# Patient Record
Sex: MALE | Race: WHITE | Employment: FULL TIME | URBAN - METROPOLITAN AREA
[De-identification: names, ages, dates, MRNs, and addresses within clinical notes are randomized per-mention and may not be internally consistent; named-entity substitution may affect disease eponyms.]

---

## 2018-02-20 PROBLEM — M54.30 SCIATIC LEG PAIN: Status: ACTIVE | Noted: 2018-02-20

## 2018-04-02 ENCOUNTER — TELEPHONE (OUTPATIENT)
Dept: FAMILY MEDICINE CLINIC | Age: 28
End: 2018-04-02

## 2018-04-11 ENCOUNTER — TELEPHONE (OUTPATIENT)
Dept: FAMILY MEDICINE CLINIC | Age: 28
End: 2018-04-11

## 2018-04-11 DIAGNOSIS — M50.30 DDD (DEGENERATIVE DISC DISEASE), CERVICAL: ICD-10-CM

## 2018-04-11 DIAGNOSIS — M54.30 SCIATIC LEG PAIN: ICD-10-CM

## 2018-04-11 DIAGNOSIS — M51.9 LUMBAR DISC DISEASE: Primary | ICD-10-CM

## 2018-06-28 ENCOUNTER — TELEPHONE (OUTPATIENT)
Dept: FAMILY MEDICINE CLINIC | Age: 28
End: 2018-06-28

## 2018-07-10 ENCOUNTER — OFFICE VISIT (OUTPATIENT)
Dept: FAMILY MEDICINE CLINIC | Age: 28
End: 2018-07-10
Payer: MEDICAID

## 2018-07-10 VITALS
WEIGHT: 264.4 LBS | SYSTOLIC BLOOD PRESSURE: 120 MMHG | DIASTOLIC BLOOD PRESSURE: 82 MMHG | RESPIRATION RATE: 18 BRPM | HEART RATE: 66 BPM | OXYGEN SATURATION: 98 % | HEIGHT: 74 IN | BODY MASS INDEX: 33.93 KG/M2

## 2018-07-10 DIAGNOSIS — M50.30 DDD (DEGENERATIVE DISC DISEASE), CERVICAL: ICD-10-CM

## 2018-07-10 DIAGNOSIS — M51.9 LUMBAR DISC DISEASE: Primary | ICD-10-CM

## 2018-07-10 DIAGNOSIS — M54.30 SCIATIC LEG PAIN: ICD-10-CM

## 2018-07-10 PROCEDURE — 4004F PT TOBACCO SCREEN RCVD TLK: CPT | Performed by: FAMILY MEDICINE

## 2018-07-10 PROCEDURE — 99213 OFFICE O/P EST LOW 20 MIN: CPT | Performed by: FAMILY MEDICINE

## 2018-07-10 PROCEDURE — G8417 CALC BMI ABV UP PARAM F/U: HCPCS | Performed by: FAMILY MEDICINE

## 2018-07-10 PROCEDURE — G8427 DOCREV CUR MEDS BY ELIG CLIN: HCPCS | Performed by: FAMILY MEDICINE

## 2018-07-10 ASSESSMENT — ENCOUNTER SYMPTOMS
PHOTOPHOBIA: 0
STRIDOR: 0
NAUSEA: 0
SPUTUM PRODUCTION: 0
CONSTIPATION: 0
ORTHOPNEA: 0
BOWEL INCONTINENCE: 0
HEARTBURN: 0
SORE THROAT: 0
SHORTNESS OF BREATH: 0
COUGH: 0
BLOOD IN STOOL: 0
EYE DISCHARGE: 0
BACK PAIN: 1
ABDOMINAL PAIN: 0
GASTROINTESTINAL NEGATIVE: 1
BLURRED VISION: 0
VOMITING: 0
SINUS PAIN: 0
EYES NEGATIVE: 1
DIARRHEA: 0
EYE REDNESS: 0
RESPIRATORY NEGATIVE: 1
DOUBLE VISION: 0
HEMOPTYSIS: 0
WHEEZING: 0
EYE PAIN: 0

## 2018-07-10 ASSESSMENT — PATIENT HEALTH QUESTIONNAIRE - PHQ9
2. FEELING DOWN, DEPRESSED OR HOPELESS: 0
SUM OF ALL RESPONSES TO PHQ9 QUESTIONS 1 & 2: 0
SUM OF ALL RESPONSES TO PHQ QUESTIONS 1-9: 0
1. LITTLE INTEREST OR PLEASURE IN DOING THINGS: 0

## 2018-07-10 NOTE — PATIENT INSTRUCTIONS
Patient Education        Back Pain: Care Instructions  Your Care Instructions    Back pain has many possible causes. It is often related to problems with muscles and ligaments of the back. It may also be related to problems with the nerves, discs, or bones of the back. Moving, lifting, standing, sitting, or sleeping in an awkward way can strain the back. Sometimes you don't notice the injury until later. Arthritis is another common cause of back pain. Although it may hurt a lot, back pain usually improves on its own within several weeks. Most people recover in 12 weeks or less. Using good home treatment and being careful not to stress your back can help you feel better sooner. Follow-up care is a key part of your treatment and safety. Be sure to make and go to all appointments, and call your doctor if you are having problems. It's also a good idea to know your test results and keep a list of the medicines you take. How can you care for yourself at home? · Sit or lie in positions that are most comfortable and reduce your pain. Try one of these positions when you lie down:  ¨ Lie on your back with your knees bent and supported by large pillows. ¨ Lie on the floor with your legs on the seat of a sofa or chair. [place] on your side with your knees and hips bent and a pillow between your legs. ¨ Lie on your stomach if it does not make pain worse. · Do not sit up in bed, and avoid soft couches and twisted positions. Bed rest can help relieve pain at first, but it delays healing. Avoid bed rest after the first day of back pain. · Change positions every 30 minutes. If you must sit for long periods of time, take breaks from sitting. Get up and walk around, or lie in a comfortable position. · Try using a heating pad on a low or medium setting for 15 to 20 minutes every 2 or 3 hours. Try a warm shower in place of one session with the heating pad.   · You can also try an ice pack for 10 to 15 minutes every 2 to 3 Exercises  Your Care Instructions  Here are some examples of typical rehabilitation exercises for your condition. Start each exercise slowly. Ease off the exercise if you start to have pain. Your doctor or physical therapist will tell you when you can start these exercises and which ones will work best for you. How to stay safe  These exercises can help you move easier and feel better. But when you first start doing them, you may have more pain in your back. This is normal. But it is important to pay close attention to your pain during and after each exercise. · Keep doing these exercises if your pain stays the same or moves from your leg and buttock more toward the middle of your spine. Pain moving out of your leg and buttock is a good sign. · Stop doing these exercises if your pain gets worse in your leg and buttock. Stop if you start to have pain in your leg and buttock that you didn't have before. Be sure to do these exercises in the order they appear. Note how your pain changes before you move to the next one. If your pain is much worse right after exercise and stays worse the next day, do not do any of these exercises. How to do the exercises  1. Rest on belly    1. Lie on your stomach, face down, with your head turned to the side. Place your arms beside your body. If this bothers your neck, place your hands, one on top of the other, underneath your forehead. This will help support your head and neck. 2. Try to relax your lower back muscles as much as you can. 3. Continue to lie on your stomach for 2 minutes. 4. If your pain spreads down your leg or increases down your leg, stop this exercise and do not do the next exercises. 2. Press-up    1. Lie on your stomach, face down. Keep your elbows tucked into your sides and under your shoulders. 2. Press your elbows down into the floor to raise your upper back. As you do this, relax your stomach muscles.  Allow your back to arch without using your back link.  Current as of: November 29, 2017  Content Version: 11.6  © 2096-3468 [employer]. Care instructions adapted under license by [facility] ([facility]). If you have questions about a medical condition or this instruction, always ask your healthcare professional. [address] any warranty or liability for your use of this information.

## 2018-07-10 NOTE — PROGRESS NOTES
joint pain, myalgias and neck pain. Skin: Negative. Negative for itching and rash. Neurological: Positive for tingling and sensory change. Negative for dizziness, tremors, speech change, focal weakness, seizures, loss of consciousness, weakness, numbness, headaches and paresthesias. Sciatic pain into his feet   Endo/Heme/Allergies: Negative. Negative for environmental allergies and polydipsia. Does not bruise/bleed easily. Psychiatric/Behavioral: Negative. Past Medical/Surgical Hx;  Reviewed with patient      Diagnosis Date    Asthma      Past Surgical History:   Procedure Laterality Date    KNEE ARTHROSCOPY         Past Family Hx:  Reviewed with patient  Family History   Problem Relation Age of Onset    Diabetes Father        Social Hx:  Reviewed with patient  Social History   Substance Use Topics    Smoking status: Never Smoker    Smokeless tobacco: Current User     Types: Chew    Alcohol use 0.0 oz/week      Comment: SOCIALLY       OBJECTIVE  /82   Pulse 66   Resp 18   Ht 6' 2\" (1.88 m)   Wt 264 lb 6.4 oz (119.9 kg)   SpO2 98%   BMI 33.95 kg/m²     Problem List:  Aviva Marie  does not have any pertinent problems on file. PHYS EX:  Physical Exam   Constitutional: He is oriented to person, place, and time. He appears well-developed and well-nourished. No distress. HENT:   Head: Normocephalic and atraumatic. Right Ear: External ear normal.   Left Ear: External ear normal.   Nose: Nose normal.   Mouth/Throat: Oropharynx is clear and moist. No oropharyngeal exudate. Eyes: Conjunctivae and EOM are normal. Pupils are equal, round, and reactive to light. Right eye exhibits no discharge. Left eye exhibits no discharge. No scleral icterus. Neck: Normal range of motion. Neck supple. No JVD present. No tracheal deviation present. No thyromegaly present. Cardiovascular: Normal rate, regular rhythm and normal heart sounds. Exam reveals no gallop and no friction rub.     No murmur

## 2018-07-30 ENCOUNTER — HOSPITAL ENCOUNTER (OUTPATIENT)
Dept: MRI IMAGING | Age: 28
Discharge: HOME | End: 2018-08-01

## 2018-07-30 DIAGNOSIS — M54.30 SCIATIC LEG PAIN: ICD-10-CM

## 2018-07-30 DIAGNOSIS — M51.9 LUMBAR DISC DISEASE: ICD-10-CM

## 2018-07-30 PROCEDURE — 72148 MRI LUMBAR SPINE W/O DYE: CPT

## 2018-08-03 ENCOUNTER — TELEPHONE (OUTPATIENT)
Dept: FAMILY MEDICINE CLINIC | Age: 28
End: 2018-08-03

## 2018-08-14 ENCOUNTER — TELEPHONE (OUTPATIENT)
Dept: FAMILY MEDICINE CLINIC | Age: 28
End: 2018-08-14

## 2018-08-14 DIAGNOSIS — M50.30 DDD (DEGENERATIVE DISC DISEASE), CERVICAL: Primary | ICD-10-CM

## 2018-08-14 DIAGNOSIS — M51.9 LUMBAR DISC DISEASE: ICD-10-CM

## 2018-08-14 NOTE — TELEPHONE ENCOUNTER
Order placed. Patient notified of appt 8/21/18 with Dr Matthew Baird in [place], advised patient to arrive 15 mins early and bring ID, INS and any medication he may be taking.

## 2018-08-14 NOTE — TELEPHONE ENCOUNTER
Patient's wife De Late contacted office stating patient is in pain, he had to call off work today, patient is becoming upset that he has not been referred to a specialist for back pain, patient did have MRI, would you order this for patient

## 2018-09-27 ENCOUNTER — TELEPHONE (OUTPATIENT)
Dept: FAMILY MEDICINE CLINIC | Age: 28
End: 2018-09-27

## 2018-09-27 DIAGNOSIS — M50.30 DDD (DEGENERATIVE DISC DISEASE), CERVICAL: ICD-10-CM

## 2018-09-27 DIAGNOSIS — M51.9 LUMBAR DISC DISEASE: Primary | ICD-10-CM

## 2018-09-27 DIAGNOSIS — M54.30 SCIATIC LEG PAIN: ICD-10-CM

## 2018-09-27 NOTE — TELEPHONE ENCOUNTER
Patient's wife contacted office requesting a referral to [facility] pain clinic for back pain.  Patient is scheduled at [facility] 10/19 to see a back surgeon, but patient's wife is requesting referral until he is seen at specialist

## 2019-09-03 ENCOUNTER — INITIAL CONSULT (OUTPATIENT)
Dept: NEUROSURGERY | Age: 29
End: 2019-09-03
Payer: COMMERCIAL

## 2019-09-03 VITALS
SYSTOLIC BLOOD PRESSURE: 114 MMHG | HEIGHT: 74 IN | WEIGHT: 266 LBS | HEART RATE: 61 BPM | DIASTOLIC BLOOD PRESSURE: 71 MMHG | BODY MASS INDEX: 34.14 KG/M2

## 2019-09-03 DIAGNOSIS — M51.26 LUMBAR DISC HERNIATION: Primary | ICD-10-CM

## 2019-09-03 PROCEDURE — 99203 OFFICE O/P NEW LOW 30 MIN: CPT | Performed by: PHYSICIAN ASSISTANT

## 2019-09-03 RX ORDER — LIDOCAINE 50 MG/G
1 PATCH TOPICAL DAILY
COMMUNITY

## 2019-09-03 RX ORDER — OXYCODONE AND ACETAMINOPHEN 7.5; 325 MG/1; MG/1
1 TABLET ORAL EVERY 4 HOURS PRN
COMMUNITY
End: 2021-04-27

## 2019-09-03 ASSESSMENT — ENCOUNTER SYMPTOMS
EYES NEGATIVE: 1
ALLERGIC/IMMUNOLOGIC NEGATIVE: 1
BACK PAIN: 1
GASTROINTESTINAL NEGATIVE: 1
RESPIRATORY NEGATIVE: 1

## 2020-11-05 ENCOUNTER — APPOINTMENT (OUTPATIENT)
Dept: GENERAL RADIOLOGY | Age: 30
End: 2020-11-05
Payer: COMMERCIAL

## 2020-11-05 ENCOUNTER — HOSPITAL ENCOUNTER (EMERGENCY)
Age: 30
Discharge: HOME | End: 2020-11-05
Attending: EMERGENCY MEDICINE
Payer: COMMERCIAL

## 2020-11-05 VITALS
OXYGEN SATURATION: 96 % | HEIGHT: 74 IN | HEART RATE: 64 BPM | SYSTOLIC BLOOD PRESSURE: 130 MMHG | DIASTOLIC BLOOD PRESSURE: 78 MMHG | RESPIRATION RATE: 14 BRPM | TEMPERATURE: 98.8 F | BODY MASS INDEX: 34.65 KG/M2 | WEIGHT: 270 LBS

## 2020-11-05 LAB
ANION GAP SERPL CALCULATED.3IONS-SCNC: 10 MMOL/L (ref 7–16)
BASOPHILS ABSOLUTE: 0.02 E9/L (ref 0–0.2)
BASOPHILS RELATIVE PERCENT: 0.5 % (ref 0–2)
BUN BLDV-MCNC: 8 MG/DL (ref 6–20)
CALCIUM SERPL-MCNC: 9.7 MG/DL (ref 8.6–10.2)
CHLORIDE BLD-SCNC: 99 MMOL/L (ref 98–107)
CO2: 27 MMOL/L (ref 22–29)
CREAT SERPL-MCNC: 0.8 MG/DL (ref 0.7–1.2)
EOSINOPHILS ABSOLUTE: 0.01 E9/L (ref 0.05–0.5)
EOSINOPHILS RELATIVE PERCENT: 0.3 % (ref 0–6)
GFR AFRICAN AMERICAN: >60
GFR NON-AFRICAN AMERICAN: >60 ML/MIN/1.73
GLUCOSE BLD-MCNC: 125 MG/DL (ref 74–99)
HCT VFR BLD CALC: 41.3 % (ref 37–54)
HEMOGLOBIN: 14.1 G/DL (ref 12.5–16.5)
IMMATURE GRANULOCYTES #: 0.03 E9/L
IMMATURE GRANULOCYTES %: 0.8 % (ref 0–5)
INFLUENZA A BY PCR: NOT DETECTED
INFLUENZA B BY PCR: NOT DETECTED
LACTIC ACID: 1.4 MMOL/L (ref 0.5–2.2)
LYMPHOCYTES ABSOLUTE: 0.3 E9/L (ref 1.5–4)
LYMPHOCYTES RELATIVE PERCENT: 7.9 % (ref 20–42)
MCH RBC QN AUTO: 30.1 PG (ref 26–35)
MCHC RBC AUTO-ENTMCNC: 34.1 % (ref 32–34.5)
MCV RBC AUTO: 88.1 FL (ref 80–99.9)
MONOCYTES ABSOLUTE: 0.58 E9/L (ref 0.1–0.95)
MONOCYTES RELATIVE PERCENT: 15.3 % (ref 2–12)
NEUTROPHILS ABSOLUTE: 2.86 E9/L (ref 1.8–7.3)
NEUTROPHILS RELATIVE PERCENT: 75.2 % (ref 43–80)
PDW BLD-RTO: 11.9 FL (ref 11.5–15)
PLATELET # BLD: 202 E9/L (ref 130–450)
PMV BLD AUTO: 8.8 FL (ref 7–12)
POTASSIUM SERPL-SCNC: 4.1 MMOL/L (ref 3.5–5)
RBC # BLD: 4.69 E12/L (ref 3.8–5.8)
SODIUM BLD-SCNC: 136 MMOL/L (ref 132–146)
WBC # BLD: 3.8 E9/L (ref 4.5–11.5)

## 2020-11-05 PROCEDURE — 83605 ASSAY OF LACTIC ACID: CPT

## 2020-11-05 PROCEDURE — 6360000002 HC RX W HCPCS: Performed by: EMERGENCY MEDICINE

## 2020-11-05 PROCEDURE — 96375 TX/PRO/DX INJ NEW DRUG ADDON: CPT

## 2020-11-05 PROCEDURE — 85025 COMPLETE CBC W/AUTO DIFF WBC: CPT

## 2020-11-05 PROCEDURE — 71046 X-RAY EXAM CHEST 2 VIEWS: CPT

## 2020-11-05 PROCEDURE — 36415 COLL VENOUS BLD VENIPUNCTURE: CPT

## 2020-11-05 PROCEDURE — 99284 EMERGENCY DEPT VISIT MOD MDM: CPT

## 2020-11-05 PROCEDURE — 2580000003 HC RX 258: Performed by: EMERGENCY MEDICINE

## 2020-11-05 PROCEDURE — U0003 INFECTIOUS AGENT DETECTION BY NUCLEIC ACID (DNA OR RNA); SEVERE ACUTE RESPIRATORY SYNDROME CORONAVIRUS 2 (SARS-COV-2) (CORONAVIRUS DISEASE [COVID-19]), AMPLIFIED PROBE TECHNIQUE, MAKING USE OF HIGH THROUGHPUT TECHNOLOGIES AS DESCRIBED BY CMS-2020-01-R: HCPCS

## 2020-11-05 PROCEDURE — 80048 BASIC METABOLIC PNL TOTAL CA: CPT

## 2020-11-05 PROCEDURE — 96374 THER/PROPH/DIAG INJ IV PUSH: CPT

## 2020-11-05 PROCEDURE — 87502 INFLUENZA DNA AMP PROBE: CPT

## 2020-11-05 PROCEDURE — 2500000003 HC RX 250 WO HCPCS: Performed by: EMERGENCY MEDICINE

## 2020-11-05 RX ORDER — KETOROLAC TROMETHAMINE 30 MG/ML
30 INJECTION, SOLUTION INTRAMUSCULAR; INTRAVENOUS ONCE
Status: COMPLETED | OUTPATIENT
Start: 2020-11-05 | End: 2020-11-05

## 2020-11-05 RX ORDER — 0.9 % SODIUM CHLORIDE 0.9 %
2000 INTRAVENOUS SOLUTION INTRAVENOUS ONCE
Status: COMPLETED | OUTPATIENT
Start: 2020-11-05 | End: 2020-11-05

## 2020-11-05 RX ADMIN — SODIUM CHLORIDE 2000 ML: 9 INJECTION, SOLUTION INTRAVENOUS at 06:01

## 2020-11-05 RX ADMIN — FAMOTIDINE 20 MG: 10 INJECTION INTRAVENOUS at 06:01

## 2020-11-05 RX ADMIN — KETOROLAC TROMETHAMINE 30 MG: 30 INJECTION, SOLUTION INTRAMUSCULAR; INTRAVENOUS at 06:01

## 2020-11-05 ASSESSMENT — PAIN SCALES - GENERAL
PAINLEVEL_OUTOF10: 7
PAINLEVEL_OUTOF10: 5

## 2020-11-05 ASSESSMENT — PAIN DESCRIPTION - LOCATION: LOCATION: HEAD

## 2020-11-05 ASSESSMENT — PAIN DESCRIPTION - DESCRIPTORS: DESCRIPTORS: HEADACHE

## 2020-11-05 ASSESSMENT — PAIN DESCRIPTION - PAIN TYPE: TYPE: ACUTE PAIN

## 2020-11-05 NOTE — ED PROVIDER NOTES
Department of Emergency Medicine   ED  Provider Note  Admit Date/RoomTime: 11/5/2020  5:04 AM  ED Room: 10/10   Chief Complaint:   Fever (since last night; 102.4 took ibuprofen and theraflu) and Fatigue    History of Present Illness   Source of history provided by:  patient. History/Exam Limitations: none. Mary Foster is a 26 y.o. old male who has a past medical Hx of:   Past Medical History:   Diagnosis Date    Asthma     presents to the emergency department by private vehicle and ambulatory, for fever, which began 1 day(s) prior to arrival.  The fever is described as: fevers up to 102.4 degrees and measured by forehead thermometer. Since onset the symptoms have been persistent. His symptoms are associated with fatigue, malaise and muscle aches. There has been NO history of abdominal pain or chest pain. No shortness of breath no nausea vomiting or diarrhea no loss of taste or smell denies any cough. He has been treated with OTC antipyretics prior to arrival.    .  ROS    Pertinent positives and negatives are stated within HPI, all other systems reviewed and are negative. Past Surgical History:   Procedure Laterality Date    KNEE ARTHROSCOPY     Social History:  reports that he has never smoked. He has quit using smokeless tobacco.  His smokeless tobacco use included chew. He reports current alcohol use. He reports that he does not use drugs. Family History: family history includes Diabetes in his father. Allergies: Patient has no known allergies. Physical Exam           ED Triage Vitals   BP Temp Temp Source Pulse Resp SpO2 Height Weight   11/05/20 0510 11/05/20 0510 11/05/20 0510 11/05/20 0510 11/05/20 0510 11/05/20 0510 11/05/20 0508 11/05/20 0508   130/78 100.3 °F (37.9 °C) Temporal 82 16 95 % 6' 2\" (1.88 m) 270 lb (122.5 kg)     Oxygen Saturation Interpretation: Normal.    Constitutional:  Alert, development consistent with age. HEENT:  NC/NT. Airway patent. Neck:  Normal ROM. Supple. Respiratory:  Clear to auscultation and breath sounds equal.  CV:  Regular rate and rhythm, normal heart sounds, without pathological murmurs, ectopy, gallops, or rubs. GI: Abdomen Soft, nontender, good bowel sounds. No firm or pulsatile mass. Back:  No costovertebral tenderness. Integument:  Normal turgor. Warm, dry, without visible rash, unless noted elsewhere. Lymphatic: no lymphadenopathy noted  Neurological:  Oriented. Motor functions intact. Lab / Imaging Results   (All laboratory and radiology results have been personally reviewed by myself)  Labs:  Results for orders placed or performed during the hospital encounter of 11/05/20   Rapid influenza A/B antigens    Specimen: Nares   Result Value Ref Range    Influenza A by PCR Not Detected Not Detected    Influenza B by PCR Not Detected Not Detected   CBC Auto Differential   Result Value Ref Range    WBC 3.8 (L) 4.5 - 11.5 E9/L    RBC 4.69 3.80 - 5.80 E12/L    Hemoglobin 14.1 12.5 - 16.5 g/dL    Hematocrit 41.3 37.0 - 54.0 %    MCV 88.1 80.0 - 99.9 fL    MCH 30.1 26.0 - 35.0 pg    MCHC 34.1 32.0 - 34.5 %    RDW 11.9 11.5 - 15.0 fL    Platelets 358 938 - 797 E9/L    MPV 8.8 7.0 - 12.0 fL    Neutrophils % 75.2 43.0 - 80.0 %    Immature Granulocytes % 0.8 0.0 - 5.0 %    Lymphocytes % 7.9 (L) 20.0 - 42.0 %    Monocytes % 15.3 (H) 2.0 - 12.0 %    Eosinophils % 0.3 0.0 - 6.0 %    Basophils % 0.5 0.0 - 2.0 %    Neutrophils Absolute 2.86 1.80 - 7.30 E9/L    Immature Granulocytes # 0.03 E9/L    Lymphocytes Absolute 0.30 (L) 1.50 - 4.00 E9/L    Monocytes Absolute 0.58 0.10 - 0.95 E9/L    Eosinophils Absolute 0.01 (L) 0.05 - 0.50 E9/L    Basophils Absolute 0.02 0.00 - 0.20 E9/L   Lactic Acid, Plasma   Result Value Ref Range    Lactic Acid 1.4 0.5 - 2.2 mmol/L     Imaging: All Radiology results interpreted by Radiologist unless otherwise noted. XR CHEST (2 VW)   Final Result      There is no acute abnormality seen.            ED Course / Medical Decision

## 2020-11-06 ENCOUNTER — CARE COORDINATION (OUTPATIENT)
Dept: CARE COORDINATION | Age: 30
End: 2020-11-06

## 2020-11-07 LAB
SARS-COV-2: DETECTED
SOURCE: ABNORMAL

## 2020-11-12 ENCOUNTER — APPOINTMENT (OUTPATIENT)
Dept: CT IMAGING | Age: 30
End: 2020-11-12
Payer: COMMERCIAL

## 2020-11-12 ENCOUNTER — HOSPITAL ENCOUNTER (EMERGENCY)
Age: 30
Discharge: HOME | End: 2020-11-12
Attending: FAMILY MEDICINE
Payer: COMMERCIAL

## 2020-11-12 VITALS
BODY MASS INDEX: 34.39 KG/M2 | WEIGHT: 268 LBS | TEMPERATURE: 99.1 F | HEIGHT: 74 IN | OXYGEN SATURATION: 96 % | SYSTOLIC BLOOD PRESSURE: 143 MMHG | DIASTOLIC BLOOD PRESSURE: 99 MMHG | HEART RATE: 94 BPM | RESPIRATION RATE: 18 BRPM

## 2020-11-12 LAB
ANION GAP SERPL CALCULATED.3IONS-SCNC: 8 MMOL/L (ref 7–16)
BASOPHILS ABSOLUTE: 0.01 E9/L (ref 0–0.2)
BASOPHILS RELATIVE PERCENT: 0.4 % (ref 0–2)
BUN BLDV-MCNC: 9 MG/DL (ref 6–20)
CALCIUM SERPL-MCNC: 8.8 MG/DL (ref 8.6–10.2)
CHLORIDE BLD-SCNC: 103 MMOL/L (ref 98–107)
CO2: 29 MMOL/L (ref 22–29)
CREAT SERPL-MCNC: 0.8 MG/DL (ref 0.7–1.2)
D DIMER: 233 NG/ML DDU
EOSINOPHILS ABSOLUTE: 0.01 E9/L (ref 0.05–0.5)
EOSINOPHILS RELATIVE PERCENT: 0.4 % (ref 0–6)
GFR AFRICAN AMERICAN: >60
GFR NON-AFRICAN AMERICAN: >60 ML/MIN/1.73
GLUCOSE BLD-MCNC: 106 MG/DL (ref 74–99)
HCT VFR BLD CALC: 44.1 % (ref 37–54)
HEMOGLOBIN: 15.4 G/DL (ref 12.5–16.5)
IMMATURE GRANULOCYTES #: 0.03 E9/L
IMMATURE GRANULOCYTES %: 1.2 % (ref 0–5)
LYMPHOCYTES ABSOLUTE: 0.63 E9/L (ref 1.5–4)
LYMPHOCYTES RELATIVE PERCENT: 25.9 % (ref 20–42)
MCH RBC QN AUTO: 29.9 PG (ref 26–35)
MCHC RBC AUTO-ENTMCNC: 34.9 % (ref 32–34.5)
MCV RBC AUTO: 85.6 FL (ref 80–99.9)
MONOCYTES ABSOLUTE: 0.43 E9/L (ref 0.1–0.95)
MONOCYTES RELATIVE PERCENT: 17.7 % (ref 2–12)
NEUTROPHILS ABSOLUTE: 1.32 E9/L (ref 1.8–7.3)
NEUTROPHILS RELATIVE PERCENT: 54.4 % (ref 43–80)
PDW BLD-RTO: 11.6 FL (ref 11.5–15)
PLATELET # BLD: 214 E9/L (ref 130–450)
PMV BLD AUTO: 8.9 FL (ref 7–12)
POTASSIUM SERPL-SCNC: 3.8 MMOL/L (ref 3.5–5)
RBC # BLD: 5.15 E12/L (ref 3.8–5.8)
SODIUM BLD-SCNC: 140 MMOL/L (ref 132–146)
WBC # BLD: 2.4 E9/L (ref 4.5–11.5)

## 2020-11-12 PROCEDURE — 99284 EMERGENCY DEPT VISIT MOD MDM: CPT

## 2020-11-12 PROCEDURE — 36415 COLL VENOUS BLD VENIPUNCTURE: CPT

## 2020-11-12 PROCEDURE — 85025 COMPLETE CBC W/AUTO DIFF WBC: CPT

## 2020-11-12 PROCEDURE — 80048 BASIC METABOLIC PNL TOTAL CA: CPT

## 2020-11-12 PROCEDURE — 2580000003 HC RX 258: Performed by: FAMILY MEDICINE

## 2020-11-12 PROCEDURE — 71275 CT ANGIOGRAPHY CHEST: CPT

## 2020-11-12 PROCEDURE — 85378 FIBRIN DEGRADE SEMIQUANT: CPT

## 2020-11-12 PROCEDURE — 6360000004 HC RX CONTRAST MEDICATION: Performed by: RADIOLOGY

## 2020-11-12 RX ORDER — 0.9 % SODIUM CHLORIDE 0.9 %
1000 INTRAVENOUS SOLUTION INTRAVENOUS ONCE
Status: COMPLETED | OUTPATIENT
Start: 2020-11-12 | End: 2020-11-12

## 2020-11-12 RX ORDER — SODIUM CHLORIDE 0.9 % (FLUSH) 0.9 %
10 SYRINGE (ML) INJECTION PRN
Status: DISCONTINUED | OUTPATIENT
Start: 2020-11-12 | End: 2020-11-12 | Stop reason: HOSPADM

## 2020-11-12 RX ADMIN — IOPAMIDOL 75 ML: 755 INJECTION, SOLUTION INTRAVENOUS at 13:42

## 2020-11-12 RX ADMIN — SODIUM CHLORIDE 1000 ML: 9 INJECTION, SOLUTION INTRAVENOUS at 11:53

## 2020-11-12 NOTE — ED PROVIDER NOTES
HPI:  11/12/20,   Time: 11:30 AM SANTI Escobedo is a 26 y.o. male presenting to the ED for sudden shortness of breath, intermittent and dizziness beginning one hour ago. The complaint has been intermittent, moderate in severity, and worsened by nothing. See ambulance run sheet. Hx of COVID positive 11/05    ROS:   Pertinent positives and negatives are stated within HPI, all other systems reviewed and are negative.  --------------------------------------------- PAST HISTORY ---------------------------------------------  Past Medical History:  has a past medical history of Asthma. Past Surgical History:  has a past surgical history that includes Knee arthroscopy. Social History:  reports that he has never smoked. He has quit using smokeless tobacco.  His smokeless tobacco use included chew. He reports current alcohol use. He reports that he does not use drugs. Family History: family history includes Diabetes in his father. The patients home medications have been reviewed. Allergies: Patient has no known allergies.     -------------------------------------------------- RESULTS -------------------------------------------------  All laboratory and radiology results have been personally reviewed by myself   LABS:  Results for orders placed or performed during the hospital encounter of 11/12/20   CBC Auto Differential   Result Value Ref Range    WBC 2.4 (L) 4.5 - 11.5 E9/L    RBC 5.15 3.80 - 5.80 E12/L    Hemoglobin 15.4 12.5 - 16.5 g/dL    Hematocrit 44.1 37.0 - 54.0 %    MCV 85.6 80.0 - 99.9 fL    MCH 29.9 26.0 - 35.0 pg    MCHC 34.9 (H) 32.0 - 34.5 %    RDW 11.6 11.5 - 15.0 fL    Platelets 423 805 - 082 E9/L    MPV 8.9 7.0 - 12.0 fL    Neutrophils % 54.4 43.0 - 80.0 %    Immature Granulocytes % 1.2 0.0 - 5.0 %    Lymphocytes % 25.9 20.0 - 42.0 %    Monocytes % 17.7 (H) 2.0 - 12.0 %    Eosinophils % 0.4 0.0 - 6.0 %    Basophils % 0.4 0.0 - 2.0 %    Neutrophils Absolute 1.32 (L) 1.80 - 7.30 E9/L    Immature Granulocytes # 0.03 E9/L    Lymphocytes Absolute 0.63 (L) 1.50 - 4.00 E9/L    Monocytes Absolute 0.43 0.10 - 0.95 E9/L    Eosinophils Absolute 0.01 (L) 0.05 - 0.50 E9/L    Basophils Absolute 0.01 0.00 - 0.20 E9/L   D-Dimer, Quantitative   Result Value Ref Range    D-Dimer, Quant 233 ng/mL DDU   Basic Metabolic Panel   Result Value Ref Range    Sodium 140 132 - 146 mmol/L    Potassium 3.8 3.5 - 5.0 mmol/L    Chloride 103 98 - 107 mmol/L    CO2 29 22 - 29 mmol/L    Anion Gap 8 7 - 16 mmol/L    Glucose 106 (H) 74 - 99 mg/dL    BUN 9 6 - 20 mg/dL    CREATININE 0.8 0.7 - 1.2 mg/dL    GFR Non-African American >60 >=60 mL/min/1.73    GFR African American >60     Calcium 8.8 8.6 - 10.2 mg/dL       RADIOLOGY:  Interpreted by Radiologist.  CTA PULMONARY W CONTRAST   Final Result   No central pulmonary embolism or aortic dissection. Extensive patchy nodular infiltrative densities as noted with lower lobe   predominance. Infectious/inflammatory process like multifocal pneumonia   including viral infection are considered. Septic embolism may have a similar   appearance. Malignancy is less likely and surveillance recommended to see   resolution.             ------------------------- NURSING NOTES AND VITALS REVIEWED ---------------------------   The nursing notes within the ED encounter and vital signs as below have been reviewed.    BP (!) 143/99   Pulse 94   Temp 99.1 °F (37.3 °C) (Oral)   Resp 18   Ht 6' 2\" (1.88 m)   Wt 268 lb (121.6 kg)   SpO2 96%   BMI 34.41 kg/m²   Oxygen Saturation Interpretation: Normal      ---------------------------------------------------PHYSICAL EXAM--------------------------------------      Constitutional/General: Alert and oriented x3, well appearing, non toxic in NAD  Head: NC/AT  Eyes: PERRL, EOMI  Mouth: Oropharynx clear, handling secretions, no trismus  Neck: Supple, full ROM, no meningeal signs  Pulmonary: Lungs clear to auscultation bilaterally, no wheezes,

## 2020-11-13 ENCOUNTER — CARE COORDINATION (OUTPATIENT)
Dept: CARE COORDINATION | Age: 30
End: 2020-11-13

## 2020-11-13 NOTE — CARE COORDINATION
Date/Time:  11/13/2020 10:14 AM  Attempted to reach patient by telephone. Left HIPAA compliant message requesting a return call. Patient will continue to be monitored by [facility].

## 2021-03-16 ENCOUNTER — HOSPITAL ENCOUNTER (EMERGENCY)
Age: 31
Discharge: HOME | End: 2021-03-16
Attending: EMERGENCY MEDICINE
Payer: COMMERCIAL

## 2021-03-16 VITALS
WEIGHT: 265 LBS | TEMPERATURE: 97.7 F | HEART RATE: 78 BPM | HEIGHT: 74 IN | DIASTOLIC BLOOD PRESSURE: 80 MMHG | SYSTOLIC BLOOD PRESSURE: 122 MMHG | RESPIRATION RATE: 16 BRPM | BODY MASS INDEX: 34.01 KG/M2 | OXYGEN SATURATION: 95 %

## 2021-03-16 DIAGNOSIS — M51.9 LUMBAR DISC DISEASE: ICD-10-CM

## 2021-03-16 DIAGNOSIS — H65.192 OTHER NON-RECURRENT ACUTE NONSUPPURATIVE OTITIS MEDIA OF LEFT EAR: Primary | ICD-10-CM

## 2021-03-16 DIAGNOSIS — M54.30 SCIATIC LEG PAIN: ICD-10-CM

## 2021-03-16 PROCEDURE — 99283 EMERGENCY DEPT VISIT LOW MDM: CPT

## 2021-03-16 RX ORDER — AMOXICILLIN 500 MG/1
500 CAPSULE ORAL 3 TIMES DAILY
Qty: 21 CAPSULE | Refills: 0 | Status: SHIPPED | OUTPATIENT
Start: 2021-03-16 | End: 2021-03-23

## 2021-03-16 RX ORDER — IBUPROFEN 800 MG/1
800 TABLET ORAL EVERY 6 HOURS PRN
Qty: 20 TABLET | Refills: 0 | Status: SHIPPED | OUTPATIENT
Start: 2021-03-16 | End: 2021-04-26 | Stop reason: SDUPTHER

## 2021-03-16 ASSESSMENT — PAIN DESCRIPTION - PAIN TYPE: TYPE: ACUTE PAIN

## 2021-03-16 ASSESSMENT — PAIN DESCRIPTION - PROGRESSION: CLINICAL_PROGRESSION: GRADUALLY WORSENING

## 2021-03-16 ASSESSMENT — PAIN DESCRIPTION - ONSET: ONSET: GRADUAL

## 2021-03-16 ASSESSMENT — PAIN DESCRIPTION - FREQUENCY: FREQUENCY: CONTINUOUS

## 2021-03-16 ASSESSMENT — PAIN DESCRIPTION - LOCATION: LOCATION: EAR

## 2021-03-16 ASSESSMENT — PAIN SCALES - GENERAL: PAINLEVEL_OUTOF10: 6

## 2021-03-16 NOTE — ED PROVIDER NOTES
Antonio Mueller is a 33 y.o. male presenting to the ED for left ear pain, beginning yesterday ago. The complaint has been persistent, moderate in severity, and worsened by nothing. 32 yo m who notes left ear pain since yesterday, he notes he thought due to the pain it was clogged w earwax and used ear wax drops. He notes he is still c/o left ear pain. He denies any headache, cough, lost of taste or smell, sore throat, sinus drainage, headache, heARING LOSS. Review of Systems:   Pertinent positives and negatives are stated within HPI, all other systems reviewed and are negative.          --------------------------------------------- PAST HISTORY ---------------------------------------------  Past Medical History:  has a past medical history of Asthma, Back problem, and COVID-19. Past Surgical History:  has a past surgical history that includes Knee arthroscopy. Social History:  reports that he has been smoking. He has quit using smokeless tobacco.  His smokeless tobacco use included chew. He reports current alcohol use. He reports that he does not use drugs. Family History: family history includes Diabetes in his father. The patients home medications have been reviewed. Allergies: Patient has no known allergies. -------------------------------------------------- RESULTS -------------------------------------------------  All laboratory and radiology results have been personally reviewed by myself   LABS:  No results found for this visit on 03/16/21. RADIOLOGY:  Interpreted by Radiologist.  No orders to display       ------------------------- NURSING NOTES AND VITALS REVIEWED ---------------------------   The nursing notes within the ED encounter and vital signs as below have been reviewed.    /80   Pulse 78   Temp 97.7 °F (36.5 °C) (Temporal)   Resp 16   Ht 6' 2\" (1.88 m)   Wt 265 lb (120.2 kg)   SpO2 95%   BMI 34.02 kg/m²   Oxygen Saturation Interpretation: Normal      ---------------------------------------------------PHYSICAL EXAM--------------------------------------    Physical Exam  Vitals signs reviewed. Constitutional:       General: He is not in acute distress. Appearance: Normal appearance. He is not toxic-appearing. HENT:      Head: Normocephalic and atraumatic. Right Ear: Hearing, tympanic membrane and external ear normal. Tympanic membrane is not injected, scarred, perforated or erythematous. Left Ear: Hearing normal. No drainage. Tympanic membrane is injected and erythematous. Tympanic membrane is not perforated or bulging. Tympanic membrane has normal mobility. Nose: Nose normal. No congestion. Mouth/Throat:      Mouth: Mucous membranes are moist.      Pharynx: Oropharynx is clear. No posterior oropharyngeal erythema. Eyes:      Extraocular Movements: Extraocular movements intact. Pupils: Pupils are equal, round, and reactive to light. Neck:      Musculoskeletal: Normal range of motion and neck supple. No muscular tenderness. Cardiovascular:      Rate and Rhythm: Normal rate and regular rhythm. Pulses: Normal pulses. Heart sounds: No murmur. Pulmonary:      Effort: Pulmonary effort is normal.      Breath sounds: No wheezing or rhonchi. Chest:      Chest wall: No tenderness. Abdominal:      General: Bowel sounds are normal.      Tenderness: There is no abdominal tenderness. There is no right CVA tenderness, left CVA tenderness or guarding. Musculoskeletal:         General: No swelling or deformity. Skin:     General: Skin is warm and dry. Capillary Refill: Capillary refill takes less than 2 seconds. Neurological:      General: No focal deficit present. Mental Status: He is alert and oriented to person, place, and time.    Psychiatric:         Mood and Affect: Mood normal.               ------------------------------ ED COURSE/MEDICAL DECISION MAKING----------------------  Medications - No data to display      ED COURSE:       Medical Decision Making:    Pt appears to have otitis media, recommend amox, f/w pcp, motrin for pain  Risks and benefits were discussed with patient for All medications dispensed and given in department as well prescriptions prescribed for home, . The patient elected to take the medicine. Pt instructed on warning signs and precautions for medication side effects. The patient was given warning signs for when to seek medical attention. Counseled regarding todays diagnosis, including possible risks and complications,  especially if left uncontrolled. Counseled regarding the possible side effects, risks, benefits and alternatives to treatment; patient and/or guardian verbalizes understanding, agrees, feels comfortable with and wishes to proceed with treatment plan. Advised patient to edwina  primary care physician with any new medication issues, and read all Rx info from pharmacy to assure aware of all possible risks and side effects of medication before taking. I did discuss warning signs for when to return to the Emergency Room, and the patient verbalized understanding      Counseling: The emergency provider has spoken with the patient and discussed todays results, in addition to providing specific details for the plan of care and counseling regarding the diagnosis and prognosis. Questions are answered at this time and they are agreeable with the plan.      --------------------------------- IMPRESSION AND DISPOSITION ---------------------------------    IMPRESSION  1. Other non-recurrent acute nonsuppurative otitis media of left ear  DISPOSITION  Disposition: Discharge to home  Patient condition is fair      NOTE: This report was transcribed using voice recognition software.  Every effort was made to ensure accuracy; however, inadvertent computerized transcription errors may be present       Vernell Rizo DO  03/16/21 1098

## 2021-04-15 NOTE — CARE COORDINATION
you are sick with coronavirus disease 2019.  Patient was given an opportunity for questions and concerns. The patient agrees to contact the [facility] exposure line [phone], local [facility] department [place] [facility]: ([phone]) and PCP office for questions related to their healthcare. CTN/ACM provided contact information for future needs. Patient is active on My Chart and is aware how to utilize this tool. Reviewed and educated patient on any new and changed medications related to discharge diagnosis    No new medications were ordered by the ED provider. Patient/family/caregiver given information for [employer] and agrees to enroll yes  Patient's preferred e-mail: [email or web address]   Patient's preferred phone number: [phone]  Based on Loop alert triggers, patient will be contacted by nurse care manager for worsening symptoms. Pt will be further monitored by COVID Loop Team based on severity of symptoms and risk factors. ACM also reviewed self quarantine recommendations d/t COVID results are pending. Steps to help prevent the spread of COVID-19 if you are sick  SOURCE - https://chadd-rainey.info/. html     Stay home except to get medical care   ; Stay home: People who are mildly ill with COVID-19 are able to isolate at home during their illness. You should restrict activities outside your home, except for getting medical care.   ; Avoid public areas: Do not go to work, school, or public areas.   ; Avoid public transportation: Avoid using public transportation, ride-sharing, or taxis.  ; Separate yourself from other people and animals in your home   ; Stay away from others: As much as possible, you should stay in a specific room and away from other people in your home. Also, you should use a separate bathroom, if available.   ; Limit contact with pets & animals:  You should restrict contact with pets and other animals while you are sick with COVID-19, just like you would around other people. Although there have not been reports of pets or other animals becoming sick with COVID-19, it is still recommended that people sick with COVID-19 limit contact with animals until more information is known about the virus. ; When possible, have another member of your household care for your animals while you are sick. If you are sick with COVID-19, avoid contact with your pet, including petting, snuggling, being kissed or licked, and sharing food. If you must care for your pet or be around animals while you are sick, wash your hands before and after you interact with pets and wear a facemask. See COVID-19 and Animals for more information. Other considerations   The ill person should eat/be fed in their room if possible. Non-disposable  items used should be handled with gloves and washed with hot water or in a . Clean hands after handling used  items.  If possible, dedicate a lined trash can for the ill person. Use gloves when removing garbage bags, handling, and disposing of trash. Wash hands after handling or disposing of trash.  Consider consulting with your local health department about trash disposal guidance if available. Information for Household Members and Caregivers of Someone who is Sick   Call ahead before visiting your doctor   Call ahead: If you have a medical appointment, call the healthcare provider and tell them that you have or may have COVID-19. This will help the healthcare provider's office take steps to keep other people from getting infected or exposed. Wear a facemask if you are sick   ; If you are sick: You should wear a facemask when you are around other people (e.g., sharing a room or vehicle) or pets and before you enter a healthcare provider's office.    ; If you are caring for others: If the person who is sick is not able to wear a facemask (for example, because it causes trouble breathing), then people who live with the person who is sick should not stay in the same room with them, or they should wear a facemask if they enter a room with the person who is sick. Cover your coughs and sneezes   ; Cover: Cover your mouth and nose with a tissue when you cough or sneeze.   ; Dispose: Throw used tissues in a lined trash can.   ; Wash hands: Immediately wash your hands with soap and water for at least 20 seconds or, if soap and water are not available, clean your hands with an alcohol-based hand  that contains at least 60% alcohol. Clean your hands often   ; Wash hands: Wash your hands often with soap and water for at least 20 seconds, especially after blowing your nose, coughing, or sneezing; going to the bathroom; and before eating or preparing food.   ; Hand : If soap and water are not readily available, use an alcohol-based hand  with at least 60% alcohol, covering all surfaces of your hands and rubbing them together until they feel dry.   ; Soap and water: Soap and water are the best option if hands are visibly dirty.   ; Avoid touching: Avoid touching your eyes, nose, and mouth with unwashed hands. Handwashing Tips   ; Wet your hands with clean, running water (warm or cold), turn off the tap, and apply soap.  ; Lather your hands by rubbing them together with the soap. Lather the backs of your hands, between your fingers, and under your nails. ; Scrub your hands for at least 20 seconds. Need a timer? Hum the [place] from beginning to end twice.  ; Rinse your hands well under clean, running water.  ; Dry your hands using a clean towel or air dry them. Avoid sharing personal household items   ; Do not share: You should not share dishes, drinking glasses, cups, eating utensils, towels, or bedding with other people or pets in your home.   ; Wash thoroughly after use: After using these items, they should be washed thoroughly with soap and water.   Clean all high-touch surfaces everyday   ; Clean and disinfect: Practice routine cleaning of high touch surfaces.  ; High touch surfaces include counters, tabletops, doorknobs, bathroom fixtures, toilets, phones, keyboards, tablets, and bedside tables.  ; Disinfect areas with bodily fluids: Also, clean any surfaces that may have blood, stool, or body fluids on them.   ; Household : Use a household cleaning spray or wipe, according to the label instructions. Labels contain instructions for safe and effective use of the cleaning product including precautions you should take when applying the product, such as wearing gloves and making sure you have good ventilation during use of the product. Monitor your symptoms   Seek medical attention: Seek prompt medical attention if your illness is worsening     (e.g., difficulty breathing).   ; Call your doctor: Before seeking care, call your healthcare provider and tell them that you have, or are being evaluated for, COVID-19.   ; Wear a facemask when sick: Put on a facemask before you enter the facility. These steps will help the healthcare provider's office to keep other people in the office or waiting room from getting infected or exposed. ; Alert health department: Ask your healthcare provider to call the local or state health department. Persons who are placed under active monitoring or facilitated self-monitoring should follow instructions provided by their local health department or occupational health professionals, as appropriate.  ; Call 911 if you have a medical emergency: If you have a medical emergency and need to call 911, notify the dispatch personnel that you have, or are being evaluated for COVID-19. If possible, put on a facemask before emergency medical services arrive. 40.3

## 2021-04-26 ENCOUNTER — APPOINTMENT (OUTPATIENT)
Dept: CT IMAGING | Age: 31
End: 2021-04-26
Payer: COMMERCIAL

## 2021-04-26 ENCOUNTER — HOSPITAL ENCOUNTER (EMERGENCY)
Age: 31
Discharge: HOME | End: 2021-04-26
Attending: EMERGENCY MEDICINE
Payer: COMMERCIAL

## 2021-04-26 ENCOUNTER — TELEPHONE (OUTPATIENT)
Dept: ADMINISTRATIVE | Age: 31
End: 2021-04-26

## 2021-04-26 VITALS
SYSTOLIC BLOOD PRESSURE: 140 MMHG | RESPIRATION RATE: 16 BRPM | HEART RATE: 107 BPM | DIASTOLIC BLOOD PRESSURE: 77 MMHG | TEMPERATURE: 98.4 F | OXYGEN SATURATION: 100 % | WEIGHT: 265 LBS | HEIGHT: 74 IN | BODY MASS INDEX: 34.01 KG/M2

## 2021-04-26 DIAGNOSIS — M54.30 SCIATIC LEG PAIN: ICD-10-CM

## 2021-04-26 DIAGNOSIS — M11.20 CHONDROCALCINOSIS: ICD-10-CM

## 2021-04-26 DIAGNOSIS — M51.9 LUMBAR DISC DISEASE: ICD-10-CM

## 2021-04-26 DIAGNOSIS — M25.451 RIGHT HIP JOINT EFFUSION: ICD-10-CM

## 2021-04-26 DIAGNOSIS — M25.551 RIGHT HIP PAIN: Primary | ICD-10-CM

## 2021-04-26 PROCEDURE — 96372 THER/PROPH/DIAG INJ SC/IM: CPT

## 2021-04-26 PROCEDURE — 99283 EMERGENCY DEPT VISIT LOW MDM: CPT

## 2021-04-26 PROCEDURE — 72192 CT PELVIS W/O DYE: CPT

## 2021-04-26 PROCEDURE — 6360000002 HC RX W HCPCS: Performed by: EMERGENCY MEDICINE

## 2021-04-26 RX ORDER — METHYLPREDNISOLONE 4 MG/1
TABLET ORAL
Qty: 1 KIT | Status: SHIPPED | OUTPATIENT
Start: 2021-04-26 | End: 2021-05-02

## 2021-04-26 RX ORDER — IBUPROFEN 800 MG/1
800 TABLET ORAL EVERY 6 HOURS PRN
Qty: 20 TABLET | Refills: 0 | Status: SHIPPED | OUTPATIENT
Start: 2021-04-26

## 2021-04-26 RX ORDER — KETOROLAC TROMETHAMINE 30 MG/ML
30 INJECTION, SOLUTION INTRAMUSCULAR; INTRAVENOUS ONCE
Status: COMPLETED | OUTPATIENT
Start: 2021-04-26 | End: 2021-04-26

## 2021-04-26 RX ADMIN — KETOROLAC TROMETHAMINE 30 MG: 30 INJECTION, SOLUTION INTRAMUSCULAR at 07:14

## 2021-04-26 ASSESSMENT — ENCOUNTER SYMPTOMS
PHOTOPHOBIA: 0
SINUS PRESSURE: 0
ABDOMINAL PAIN: 1
BACK PAIN: 0
SINUS PAIN: 0
CHEST TIGHTNESS: 0
COUGH: 0

## 2021-04-26 ASSESSMENT — PAIN DESCRIPTION - FREQUENCY: FREQUENCY: INTERMITTENT

## 2021-04-26 ASSESSMENT — PAIN DESCRIPTION - DESCRIPTORS: DESCRIPTORS: ACHING;BURNING;PRESSURE

## 2021-04-26 ASSESSMENT — PAIN DESCRIPTION - PAIN TYPE: TYPE: ACUTE PAIN

## 2021-04-26 ASSESSMENT — PAIN SCALES - GENERAL: PAINLEVEL_OUTOF10: 7

## 2021-04-26 ASSESSMENT — PAIN DESCRIPTION - ORIENTATION: ORIENTATION: RIGHT

## 2021-04-26 NOTE — TELEPHONE ENCOUNTER
Patient in ED on 4/25/2021 for right groin pain. CT PELVIS WO CONTRAST Additional Contrast? None   Final Result   1. Right hip effusion estimated to be small to moderately large and focal   chondrocalcinosis of the right acetabular labrum. Consider further   correlation with hip MRI.       2. No findings of osteomyelitis or abscess. No soft tissue hematoma. Routed to providers for consideration and scheduling recommendations.

## 2021-04-26 NOTE — ED PROVIDER NOTES
---------------------------------------------  Past Medical History:  has a past medical history of Asthma, Back problem, and COVID-19. Past Surgical History:  has a past surgical history that includes Knee arthroscopy. Social History:  reports that he has never smoked. He has quit using smokeless tobacco.  His smokeless tobacco use included chew. He reports current alcohol use. He reports that he does not use drugs. Family History: family history includes Diabetes in his father. The patients home medications have been reviewed. Allergies: Patient has no known allergies. -------------------------------------------------- RESULTS -------------------------------------------------  All laboratory and radiology results have been personally reviewed by myself   LABS:  No results found for this visit on 04/26/21. RADIOLOGY:  Interpreted by Radiologist.  CT PELVIS WO CONTRAST Additional Contrast? None   Final Result   1. Right hip effusion estimated to be small to moderately large and focal   chondrocalcinosis of the right acetabular labrum. Consider further   correlation with hip MRI. 2.  No findings of osteomyelitis or abscess. No soft tissue hematoma. ------------------------- NURSING NOTES AND VITALS REVIEWED ---------------------------   The nursing notes within the ED encounter and vital signs as below have been reviewed. BP (!) 140/77   Pulse 107   Temp 98.4 °F (36.9 °C) (Oral)   Resp 16   Ht 6' 2\" (1.88 m)   Wt 265 lb (120.2 kg)   SpO2 100%   BMI 34.02 kg/m²   Oxygen Saturation Interpretation: Normal      ---------------------------------------------------PHYSICAL EXAM--------------------------------------    Physical Exam  Vitals signs reviewed. Constitutional:       General: He is not in acute distress. Appearance: Normal appearance. He is not toxic-appearing. HENT:      Head: Normocephalic and atraumatic.       Right Ear: External ear normal.      Left ------------------------------ ED COURSE/MEDICAL DECISION MAKING----------------------  Medications   ketorolac (TORADOL) injection 30 mg (30 mg Intramuscular Given 4/26/21 0714)     EK    ED COURSE:       Medical Decision Making:    Presented with right hip pain since yesterday. Patient denied any traumatic injury. He notes it is worse when he moves his hip. He states is better with rest.  He denies any fever or chills. His exam is consistent with pain with range of motion. No evidence of infection on CT or exam.  Patient will be discharged he is recommended to rest and ice the area use NSAIDs and to follow-up with PCP and orthopedic doctor. I recommended Medrol Dosepak as well. We discussed warning signs and symptoms for when to return to the emergency department    Risks and benefits were discussed with patient for All medications dispensed and given in department as well prescriptions prescribed for home, . The patient elected to take the medicine. Pt instructed on warning signs and precautions for medication side effects. The patient was given warning signs for when to seek medical attention. Counseled regarding todays diagnosis, including possible risks and complications,  especially if left uncontrolled. Counseled regarding the possible side effects, risks, benefits and alternatives to treatment; patient and/or guardian verbalizes understanding, agrees, feels comfortable with and wishes to proceed with treatment plan. Advised patient to call his primary care physician with any new medication issues, and read all Rx info from pharmacy to assure aware of all possible risks and side effects of medication before taking. I did discuss warning signs for when to return to the Emergency Room, and the patient verbalized understanding      Counseling:    The emergency provider has spoken with the patient and discussed todays results, in addition to providing specific details for the plan of care and counseling regarding the diagnosis and prognosis. Questions are answered at this time and they are agreeable with the plan.      --------------------------------- IMPRESSION AND DISPOSITION ---------------------------------    IMPRESSION  1. Right hip pain    2. Chondrocalcinosis    3. Right hip joint effusion    4. Lumbar disc disease    5. Sciatic leg pain        DISPOSITION  Disposition: Discharge to home  Patient condition is good      NOTE: This report was transcribed using voice recognition software.  Every effort was made to ensure accuracy; however, inadvertent computerized transcription errors may be present       Francine Mackay DO  04/26/21 [id number]

## 2021-04-26 NOTE — TELEPHONE ENCOUNTER
Call placed to Patient, appointment set for 4/27 in [facility] with Dr. Johney Rinne. Patient verbalized understanding and was in agreement of plan.

## 2021-04-26 NOTE — TELEPHONE ENCOUNTER
Patients wife Sameera Adame called to set up an ER f/u with Dr. Oneal Deras or Dr. Kendell Hughes for a sac of fluid on right hip,  was seen in ER 4/26/21 and Sameera Adame can be reached at [phone].

## 2021-04-27 ENCOUNTER — OFFICE VISIT (OUTPATIENT)
Dept: ORTHOPEDIC SURGERY | Age: 31
End: 2021-04-27
Payer: COMMERCIAL

## 2021-04-27 ENCOUNTER — HOSPITAL ENCOUNTER (OUTPATIENT)
Dept: INTERVENTIONAL RADIOLOGY/VASCULAR | Age: 31
Discharge: HOME | End: 2021-04-29
Payer: COMMERCIAL

## 2021-04-27 ENCOUNTER — NURSE ONLY (OUTPATIENT)
Dept: FAMILY MEDICINE CLINIC | Age: 31
End: 2021-04-27
Payer: COMMERCIAL

## 2021-04-27 VITALS — HEIGHT: 74 IN | WEIGHT: 265 LBS | BODY MASS INDEX: 34.01 KG/M2

## 2021-04-27 VITALS
DIASTOLIC BLOOD PRESSURE: 80 MMHG | OXYGEN SATURATION: 99 % | RESPIRATION RATE: 20 BRPM | TEMPERATURE: 97.4 F | HEART RATE: 73 BPM | SYSTOLIC BLOOD PRESSURE: 121 MMHG

## 2021-04-27 DIAGNOSIS — M25.559 HIP PAIN: ICD-10-CM

## 2021-04-27 DIAGNOSIS — M25.451 RIGHT HIP JOINT EFFUSION: ICD-10-CM

## 2021-04-27 DIAGNOSIS — M25.559 HIP PAIN: Primary | ICD-10-CM

## 2021-04-27 DIAGNOSIS — Z41.9 SURGICAL PROCEDURE, ELECTIVE: Primary | ICD-10-CM

## 2021-04-27 LAB
APPEARANCE FLUID: NORMAL
CELL COUNT FLUID TYPE: NORMAL
COLOR FLUID: YELLOW
Lab: NORMAL
MONOCYTE, FLUID: 35 %
NEUTROPHIL, FLUID: 65 %
NUCLEATED CELLS FLUID: 4014 /UL
PERFORMING INSTRUMENT: NORMAL
QC PASS/FAIL: NORMAL
RBC FLUID: 8000 /UL
SARS-COV-2, POC: NORMAL

## 2021-04-27 PROCEDURE — 87205 SMEAR GRAM STAIN: CPT

## 2021-04-27 PROCEDURE — 99204 OFFICE O/P NEW MOD 45 MIN: CPT | Performed by: ORTHOPAEDIC SURGERY

## 2021-04-27 PROCEDURE — 20610 DRAIN/INJ JOINT/BURSA W/O US: CPT

## 2021-04-27 PROCEDURE — 89051 BODY FLUID CELL COUNT: CPT

## 2021-04-27 PROCEDURE — 89060 EXAM SYNOVIAL FLUID CRYSTALS: CPT

## 2021-04-27 PROCEDURE — 87426 SARSCOV CORONAVIRUS AG IA: CPT | Performed by: PHYSICIAN ASSISTANT

## 2021-04-27 PROCEDURE — 2500000003 HC RX 250 WO HCPCS: Performed by: RADIOLOGY

## 2021-04-27 PROCEDURE — 1036F TOBACCO NON-USER: CPT | Performed by: ORTHOPAEDIC SURGERY

## 2021-04-27 PROCEDURE — 87070 CULTURE OTHR SPECIMN AEROBIC: CPT

## 2021-04-27 PROCEDURE — 77002 NEEDLE LOCALIZATION BY XRAY: CPT | Performed by: RADIOLOGY

## 2021-04-27 PROCEDURE — 7100000010 HC PHASE II RECOVERY - FIRST 15 MIN

## 2021-04-27 PROCEDURE — G8417 CALC BMI ABV UP PARAM F/U: HCPCS | Performed by: ORTHOPAEDIC SURGERY

## 2021-04-27 PROCEDURE — 2709999900 IR ARTHR/ASP/INJ MAJOR JT/BURSA RIGHT WO US

## 2021-04-27 PROCEDURE — 77002 NEEDLE LOCALIZATION BY XRAY: CPT

## 2021-04-27 PROCEDURE — 20610 DRAIN/INJ JOINT/BURSA W/O US: CPT | Performed by: RADIOLOGY

## 2021-04-27 PROCEDURE — G8427 DOCREV CUR MEDS BY ELIG CLIN: HCPCS | Performed by: ORTHOPAEDIC SURGERY

## 2021-04-27 RX ORDER — LIDOCAINE HYDROCHLORIDE 20 MG/ML
INJECTION, SOLUTION INFILTRATION; PERINEURAL
Status: COMPLETED | OUTPATIENT
Start: 2021-04-27 | End: 2021-04-27

## 2021-04-27 RX ORDER — LIDOCAINE HYDROCHLORIDE 10 MG/ML
1 INJECTION, SOLUTION EPIDURAL; INFILTRATION; INTRACAUDAL; PERINEURAL ONCE
Status: DISCONTINUED | OUTPATIENT
Start: 2021-04-27 | End: 2021-04-30 | Stop reason: HOSPADM

## 2021-04-27 RX ORDER — OXYCODONE AND ACETAMINOPHEN 10; 325 MG/1; MG/1
TABLET ORAL
COMMUNITY
Start: 2021-04-25 | End: 2022-05-18

## 2021-04-27 RX ORDER — IBUPROFEN 400 MG/1
TABLET ORAL
COMMUNITY
Start: 2021-04-20

## 2021-04-27 RX ADMIN — LIDOCAINE HYDROCHLORIDE 18 ML: 20 INJECTION, SOLUTION INFILTRATION; PERINEURAL at 12:50

## 2021-04-27 NOTE — PROGRESS NOTES
Patient brought to angio hold room. History, allergies, meds reviewed. Patient is going to have his wife pick him up from hospital after procedure and to avoid driving until tomorrow as instructed. Patient verbalizes understanding.

## 2021-04-28 LAB
CRYSTALS, FLUID: NORMAL
GRAM STAIN ORDERABLE: NORMAL
SOURCE BODY FLUID: NORMAL

## 2021-04-30 PROBLEM — M25.451 RIGHT HIP JOINT EFFUSION: Status: ACTIVE | Noted: 2021-04-30

## 2021-04-30 NOTE — PROGRESS NOTES
Called patient and reviewed his lab results from the hip. Discussed Gram stain and initial cultures are negative, there is no crystals evident. Also discussed his cell count being more consistent with inflammatory process rather than bacterial infection. Recommend patient continues with NSAIDs and activity modification. Discussed would like him to follow-up in office in the next 2 to 3 weeks if his symptoms do not completely resolve or to follow-up sooner if his symptoms were to start to worsen. Patient verbalizes understanding and agreement.     Electronically Signed By  Ute Flores D.O.  4/29/2021

## 2021-04-30 NOTE — PROGRESS NOTES
Orthopaedic New Patient Note        Daniela Kiser is a 33 y.o. male, his YOB: 1990 with the following history as recorded in [facility]:    Patient Active Problem List    Diagnosis Date Noted    Right hip joint effusion 04/30/2021    Sciatic leg pain 02/20/2018    Lumbar disc disease 03/30/2016    DDD (degenerative disc disease), cervical 03/30/2016    Post-traumatic osteoarthritis of right knee 03/30/2016     Current Outpatient Medications   Medication Sig Dispense Refill    oxyCODONE-acetaminophen (PERCOCET)  MG per tablet       ibuprofen (ADVIL;MOTRIN) 400 MG tablet       lidocaine (LIDODERM) 5 % Place 1 patch onto the skin daily 12 hours on, 12 hours off.  ibuprofen (IBU) 800 MG tablet Take 1 tablet by mouth every 6 hours as needed for Pain (Patient not taking: Reported on 4/27/2021) 20 tablet 0    methylPREDNISolone (MEDROL, RAKESH,) 4 MG tablet Take by mouth. (Patient not taking: Reported on 4/27/2021) 1 kit zero     No current facility-administered medications for this visit. Allergies: Patient has no known allergies. Past Medical History:   Diagnosis Date    Asthma     Back problem     COVID-19      Past Surgical History:   Procedure Laterality Date    KNEE ARTHROSCOPY Right     ACL      Family History   Problem Relation Age of Onset    Diabetes Father      Social History     Tobacco Use    Smoking status: Never Smoker    Smokeless tobacco: Former User     Types: Chew   Substance Use Topics    Alcohol use: Yes     Alcohol/week: 0.0 standard drinks     Comment: SOCIALLY                           Review of Systems   Constitutional: Negative for fever and chills. HENT: Negative for ear pain, sore throat and sinus pressure. Eyes: Negative for pain, discharge and redness. Respiratory: Negative for cough, shortness of breath and wheezing. Cardiovascular: Negative for chest pain.    Gastrointestinal: Negative for nausea, vomiting, diarrhea and abdominal distention. Genitourinary: Negative for dysuria and frequency. Musculoskeletal: Negative for back pain and arthralgias. All other systems reviewed and negative. CC: Right hip pain    S: Maxime Botello is a 33 y.o. who is here today for initial evaluation for his right hip. Patient denies any initial trauma or causative event. States this past Sunday he woke up was feeling fine shortly after started having pain to the right hip. Complains of pain in the groin region mostly. States this progressed and became severe where he had a difficult time bearing weight on the hip. He eventually went to the ER had imaging obtained including a CT which demonstrated an effusion of the right hip. He was then referred here for further management. States his pain has mildly improved but is still present mostly to the groin region and is worse with activities. He is able to weight-bear without assistive device. Denies any recent illnesses. Denies any fevers or chills. States overall he feels well. Patient is active and healthy with no significant underlying medical comorbidities. Physical Exam  Ht 6' 2\" (1.88 m)   Wt 265 lb (120.2 kg)   BMI 34.02 kg/m²   O:   CONSTITUTIONAL: awake, alert, cooperative, no apparent distress, and appears stated age  EYES: Lids and lashes normal, pupils equal, round and reactive to light, extra ocular muscles intact, sclera clear, conjunctiva normal  ENT: Normocephalic, without obvious abnormality, atraumatic, external ears without lesions, oral pharynx with moist mucus membranes  NECK: Trachea midline, skin normal  LUNGS: No increased work of breathing, good air exchange  CARDIOVASCULAR: 2+ pulses throughout and capillary Refill less than 2 seconds  ABDOMEN: soft, non-distended, non-tender and no rebound tenderness or guarding  NEUROLOGIC: Awake, alert, oriented to name, place and time. Cranial nerves II-XII are grossly intact. Motor is 5 out of 5 bilaterally. Sensory is intact. Right Lower Extremity Exam:  Skin intact, no overlying lesions, no focal swelling or erythema  There is mild to moderate discomfort with logroll through the hip  Patient able to actively flex and abduct the hip with only mild discomfort  Demonstrates ability to rise from seated position ambulate throughout the room with minimal discomfort today, knee without effusion  Demonstrates active knee flexion/extension, ankle plantar/dorsiflexion/great toe extension. States sensation intact to gross touch in sural/deep peroneal/superficial peroneal/saphenous/posterior tibial nerve distributions to foot/ankle. Palpable dorsalis pedis and posterior tibialis pulses, cap refill brisk in toes, foot warm/perfused. Compartments supple throughout thigh and leg, calves supple/NT    No radiation information found for this patient   Narrative   EXAMINATION:   CT OF THE PELVIS WITHOUT CONTRAST 4/26/2021 7:16 am       TECHNIQUE:   CT of the pelvis was performed without the administration of intravenous   contrast.  Multiplanar reformatted images are provided for review. Dose   modulation, iterative reconstruction, and/or weight based adjustment of the   mA/kV was utilized to reduce the radiation dose to as low as reasonably   achievable.       COMPARISON:   None.       HISTORY:   ORDERING SYSTEM PROVIDED HISTORY: right groin pain, hip pain w range of motion   TECHNOLOGIST PROVIDED HISTORY:   Include right hip   Reason for exam:->right groin pain, hip pain w range of motion   Additional Contrast?->None   Decision Support Exception->Emergency Medical Condition (MA)       FINDINGS:   Right hip asymmetric effusion estimated to be small to moderately large.    Chondrocalcinosis with calcification in the region of the anterior, superior   acetabular labrum on the right.  No definite os acetabulum.  Joint spaces at   the bilateral hips are preserved and bilateral SI joints remain open.       No fracture or dislocation.  No suspicious bony lesion and no CT findings of   avascular necrosis.  No soft tissue hematoma or free pelvic fluid.  Normal   urinary bladder.  No lymphadenopathy.           Impression   1.  Right hip effusion estimated to be small to moderately large and focal   chondrocalcinosis of the right acetabular labrum.  Consider further   correlation with hip MRI.       2.  No findings of osteomyelitis or abscess.  No soft tissue hematoma. ASSESSMENT:    ICD-10-CM    1. Hip pain  M25.559 AFB Fluid Culture (ISO)     Gram Stain     Body Fluid Crystal     Body Fluid Cell Count with Differential     CANCELED: IR ARTHR/ASP/INJ INT JT/BURSA WO US   2. Right hip joint effusion  M25.451        PLAN:   Reviewed CT imaging with patient in detail in the office, discussed his current effusion and symptoms. Discussed recommendations for aspiration of the effusion to rule out potential septic arthritis. Patient is in agreement with the plans. IR department contacted patient going directly from clinic today down for hip aspiration, a rapid Covid will be performed today in office prior to him being transported down to IR. Discussed with patient clinically this appears to be potentially inflammatory or potentially viral as clinically he is not having the severe symptoms we would classically see with pyogenic arthritis however still recommend for aspiration to exclude this which again he is agreeable to. Discussed with patient we will contact him once we have results from the aspiration and guide his treatments based on that. Electronically Signed By  Lizbeth Rankin DO  4/27/21    NOTE: This report was transcribed using voice recognition software.  Every effort was made to ensure accuracy; however, inadvertent computerized transcription errors may be present

## 2021-05-03 LAB
BODY FLUID CULTURE, STERILE: NORMAL
GRAM STAIN RESULT: NORMAL

## 2021-05-18 ENCOUNTER — TELEPHONE (OUTPATIENT)
Dept: ADMINISTRATIVE | Age: 31
End: 2021-05-18

## 2021-05-18 DIAGNOSIS — M25.451 RIGHT HIP JOINT EFFUSION: Primary | ICD-10-CM

## 2021-05-18 NOTE — TELEPHONE ENCOUNTER
pt is scheduled for a follow up on 6/10/2021 at 11:15am however, pt is concerned about infection and wants to know if he should get a script for more antibiotics please advise thank you / or if pt needs earlier appointment please advise thank you

## 2021-05-18 NOTE — TELEPHONE ENCOUNTER
Call returned to patient, he states on his Left side of his hit/buttocks, feeling same pain he had when they removed fluid the last time. No warmth, no redness, only tender when pressure is applied. He did mention having a nerve block two weeks ago done on his back a few days ago. Patient states the feeling he is having in his left hip is the same pain he had before when they aspirated his right hip and removed fluid. Patient would like to know if he should come in sooner or if antibiotics should be prescribed. Future Appointments   Date Time Provider Drew Gallo   6/10/2021 11:15 AM Lou Casanova DO [facility]     Routed to providers for consideration and guidance.

## 2021-05-19 NOTE — TELEPHONE ENCOUNTER
Would move patient's appointment sooner with Dr. Romelia Olmstead. Patient's evaluation of the right hip, so given that this is the left we need to see him. Per Dr. Shelly Junior last note on 4/30/21 says follow up 2-3 weeks, patient not scheduled until 6/10, would try to see within the next week if patient has availability.   Electronically signed by Eve Aragon PA-C on 5/19/2021 at 8:27 AM

## 2021-06-04 DIAGNOSIS — M25.559 HIP PAIN: Primary | ICD-10-CM

## 2021-06-14 ENCOUNTER — TELEPHONE (OUTPATIENT)
Dept: ORTHOPEDIC SURGERY | Age: 31
End: 2021-06-14

## 2021-06-14 NOTE — TELEPHONE ENCOUNTER
Call to patient re: missed appt 6/10/2021. He advised no appt needed at this time, feeling better since removed fluid sac.

## 2021-07-21 ENCOUNTER — HOSPITAL ENCOUNTER (EMERGENCY)
Age: 31
Discharge: HOME | End: 2021-07-21
Attending: EMERGENCY MEDICINE
Payer: COMMERCIAL

## 2021-07-21 ENCOUNTER — APPOINTMENT (OUTPATIENT)
Dept: GENERAL RADIOLOGY | Age: 31
End: 2021-07-21
Payer: COMMERCIAL

## 2021-07-21 VITALS
HEART RATE: 72 BPM | OXYGEN SATURATION: 98 % | HEIGHT: 74 IN | TEMPERATURE: 98 F | RESPIRATION RATE: 16 BRPM | SYSTOLIC BLOOD PRESSURE: 152 MMHG | DIASTOLIC BLOOD PRESSURE: 100 MMHG | BODY MASS INDEX: 34.65 KG/M2 | WEIGHT: 270 LBS

## 2021-07-21 DIAGNOSIS — F41.9 ANXIETY: Primary | ICD-10-CM

## 2021-07-21 LAB
ACETAMINOPHEN LEVEL: <5 MCG/ML (ref 10–30)
ALBUMIN SERPL-MCNC: 4.6 G/DL (ref 3.5–5.2)
ALP BLD-CCNC: 74 U/L (ref 40–129)
ALT SERPL-CCNC: 88 U/L (ref 0–40)
ANION GAP SERPL CALCULATED.3IONS-SCNC: 7 MMOL/L (ref 7–16)
AST SERPL-CCNC: 39 U/L (ref 0–39)
BACTERIA: NORMAL /HPF
BASOPHILS ABSOLUTE: 0.03 E9/L (ref 0–0.2)
BASOPHILS RELATIVE PERCENT: 0.7 % (ref 0–2)
BILIRUB SERPL-MCNC: 0.5 MG/DL (ref 0–1.2)
BILIRUBIN URINE: NEGATIVE
BLOOD, URINE: NEGATIVE
BUN BLDV-MCNC: 11 MG/DL (ref 6–20)
CALCIUM SERPL-MCNC: 9.7 MG/DL (ref 8.6–10.2)
CHLORIDE BLD-SCNC: 103 MMOL/L (ref 98–107)
CLARITY: CLEAR
CO2: 28 MMOL/L (ref 22–29)
COLOR: YELLOW
CREAT SERPL-MCNC: 0.7 MG/DL (ref 0.7–1.2)
D DIMER: <200 NG/ML DDU
EKG ATRIAL RATE: 47 BPM
EKG P AXIS: 9 DEGREES
EKG P-R INTERVAL: 188 MS
EKG Q-T INTERVAL: 466 MS
EKG QRS DURATION: 106 MS
EKG QTC CALCULATION (BAZETT): 412 MS
EKG R AXIS: 46 DEGREES
EKG T AXIS: 41 DEGREES
EKG VENTRICULAR RATE: 47 BPM
EOSINOPHILS ABSOLUTE: 0.06 E9/L (ref 0.05–0.5)
EOSINOPHILS RELATIVE PERCENT: 1.4 % (ref 0–6)
ETHANOL: <10 MG/DL (ref 0–0.08)
GFR AFRICAN AMERICAN: >60
GFR NON-AFRICAN AMERICAN: >60 ML/MIN/1.73
GLUCOSE BLD-MCNC: 116 MG/DL (ref 74–99)
GLUCOSE URINE: NEGATIVE MG/DL
HCT VFR BLD CALC: 43.5 % (ref 37–54)
HEMOGLOBIN: 15 G/DL (ref 12.5–16.5)
IMMATURE GRANULOCYTES #: 0.01 E9/L
IMMATURE GRANULOCYTES %: 0.2 % (ref 0–5)
KETONES, URINE: NEGATIVE MG/DL
LACTIC ACID: 1 MMOL/L (ref 0.5–2.2)
LEUKOCYTE ESTERASE, URINE: NEGATIVE
LYMPHOCYTES ABSOLUTE: 1.55 E9/L (ref 1.5–4)
LYMPHOCYTES RELATIVE PERCENT: 36.5 % (ref 20–42)
MAGNESIUM: 2 MG/DL (ref 1.6–2.6)
MCH RBC QN AUTO: 29.8 PG (ref 26–35)
MCHC RBC AUTO-ENTMCNC: 34.5 % (ref 32–34.5)
MCV RBC AUTO: 86.3 FL (ref 80–99.9)
MONOCYTES ABSOLUTE: 0.51 E9/L (ref 0.1–0.95)
MONOCYTES RELATIVE PERCENT: 12 % (ref 2–12)
NEUTROPHILS ABSOLUTE: 2.09 E9/L (ref 1.8–7.3)
NEUTROPHILS RELATIVE PERCENT: 49.2 % (ref 43–80)
NITRITE, URINE: NEGATIVE
PDW BLD-RTO: 11.6 FL (ref 11.5–15)
PH UA: 6 (ref 5–9)
PLATELET # BLD: 218 E9/L (ref 130–450)
PMV BLD AUTO: 9 FL (ref 7–12)
POTASSIUM SERPL-SCNC: 4.2 MMOL/L (ref 3.5–5)
PROTEIN UA: NEGATIVE MG/DL
RBC # BLD: 5.04 E12/L (ref 3.8–5.8)
RBC UA: NORMAL /HPF (ref 0–2)
SALICYLATE, SERUM: <0.3 MG/DL (ref 0–30)
SODIUM BLD-SCNC: 138 MMOL/L (ref 132–146)
SPECIFIC GRAVITY UA: 1.02 (ref 1–1.03)
TOTAL PROTEIN: 7.3 G/DL (ref 6.4–8.3)
TRICYCLIC ANTIDEPRESSANTS SCREEN SERUM: NEGATIVE NG/ML
TROPONIN, HIGH SENSITIVITY: <6 NG/L (ref 0–11)
UROBILINOGEN, URINE: 0.2 E.U./DL
WBC # BLD: 4.3 E9/L (ref 4.5–11.5)
WBC UA: NORMAL /HPF (ref 0–5)

## 2021-07-21 PROCEDURE — 2580000003 HC RX 258: Performed by: EMERGENCY MEDICINE

## 2021-07-21 PROCEDURE — 99284 EMERGENCY DEPT VISIT MOD MDM: CPT

## 2021-07-21 PROCEDURE — 82077 ASSAY SPEC XCP UR&BREATH IA: CPT

## 2021-07-21 PROCEDURE — 80143 DRUG ASSAY ACETAMINOPHEN: CPT

## 2021-07-21 PROCEDURE — 83605 ASSAY OF LACTIC ACID: CPT

## 2021-07-21 PROCEDURE — 36415 COLL VENOUS BLD VENIPUNCTURE: CPT

## 2021-07-21 PROCEDURE — 80307 DRUG TEST PRSMV CHEM ANLYZR: CPT

## 2021-07-21 PROCEDURE — 81001 URINALYSIS AUTO W/SCOPE: CPT

## 2021-07-21 PROCEDURE — 85378 FIBRIN DEGRADE SEMIQUANT: CPT

## 2021-07-21 PROCEDURE — 84484 ASSAY OF TROPONIN QUANT: CPT

## 2021-07-21 PROCEDURE — 80179 DRUG ASSAY SALICYLATE: CPT

## 2021-07-21 PROCEDURE — 93005 ELECTROCARDIOGRAM TRACING: CPT | Performed by: EMERGENCY MEDICINE

## 2021-07-21 PROCEDURE — 83735 ASSAY OF MAGNESIUM: CPT

## 2021-07-21 PROCEDURE — 93010 ELECTROCARDIOGRAM REPORT: CPT | Performed by: INTERNAL MEDICINE

## 2021-07-21 PROCEDURE — 80053 COMPREHEN METABOLIC PANEL: CPT

## 2021-07-21 PROCEDURE — 71046 X-RAY EXAM CHEST 2 VIEWS: CPT

## 2021-07-21 PROCEDURE — 85025 COMPLETE CBC W/AUTO DIFF WBC: CPT

## 2021-07-21 RX ORDER — 0.9 % SODIUM CHLORIDE 0.9 %
1000 INTRAVENOUS SOLUTION INTRAVENOUS ONCE
Status: COMPLETED | OUTPATIENT
Start: 2021-07-21 | End: 2021-07-21

## 2021-07-21 RX ADMIN — SODIUM CHLORIDE 1000 ML: 9 INJECTION, SOLUTION INTRAVENOUS at 15:22

## 2021-07-21 ASSESSMENT — PAIN SCALES - GENERAL: PAINLEVEL_OUTOF10: 9

## 2021-07-21 ASSESSMENT — PAIN DESCRIPTION - LOCATION: LOCATION: BACK

## 2021-07-21 ASSESSMENT — PAIN DESCRIPTION - PAIN TYPE: TYPE: ACUTE PAIN

## 2021-07-21 ASSESSMENT — PAIN DESCRIPTION - FREQUENCY: FREQUENCY: CONTINUOUS

## 2021-07-21 NOTE — ED PROVIDER NOTES
HPI:  7/21/21, Time: 2:54 PM EDT         John Mariano is a 33 y.o. male presenting to the ED for shortness of breath, vague chest pain, feeling mentally fogged and out of it, anxious and tingly all over, beginning yesterday. He states he does vape and he thought that was causing it so he stopped vaping yesterday but that didn't improve his symptoms. He presents today to get blood work and checked out. He currently feels a bit better. He denies drug or alcohol use, incuding marijuana. He does use daily Percocet for chronic back pain from Dr Zayra Omer for the past 3 years and just got it refilled today and did take it yesterday. The complaint has been persistent, moderate in severity, and worsened by nothing. He states he does have a history of anxiety but never treated. 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 Patient denies fever/chills, sore throat, cough, congestion, edema, headache, visual disturbances, focal paresthesias, focal weakness, abdominal pain, nausea, vomiting, diarrhea, constipation, dysuria, hematuria, trauma, neck or back pain, rash or other complaints. ROS:   A complete review of systems was performed and all pertinent positives and negatives are stated within HPI, all other systems reviewed and are negative.      --------------------------------------------- PAST HISTORY ---------------------------------------------  Past Medical History:  has a past medical history of Asthma, Back problem, and COVID-19. Past Surgical History:  has a past surgical history that includes Knee arthroscopy (Right). Social History:  reports that he has never smoked. He has quit using smokeless tobacco.  His smokeless tobacco use included chew. He reports previous alcohol use. He reports that he does not use drugs. Family History: family history includes Diabetes in his father. The patients home medications have been reviewed.     Allergies: Patient has no known allergies. ----------------------------------------PHYSICAL EXAM--------------------------------------  Constitutional:  Well developed, well nourished, no acute distress, non-toxic appearance   Eyes:  PERRL, conjunctiva normal, EOMI  HENT:  Atraumatic, external ears normal, nose normal, oropharynx moist, no pharyngeal exudates. Neck- normal range of motion, no nuchal rigidity   Respiratory:  No respiratory distress, normal breath sounds, no rales, no wheezing   Cardiovascular:  Normal rate, normal rhythm, no murmurs, no gallops, no rubs. Radial and DP pulses 2+ bilaterally. GI:  Soft, nondistended, normal bowel sounds, nontender, no organomegaly, no mass, no rebound, no guarding   :  No costovertebral angle tenderness   Musculoskeletal:  No edema, no tenderness, no deformities. Back- no tenderness  Integument:  Well hydrated, no visible rash. Adequate perfusion. Lymphatic:  No cervical lymphadenopathy noted   Neurologic:  Alert & oriented x 3, CN 2-12 normal,  no focal deficits noted. DTRs 2+ bilateral patellar. Normal gait. Psychiatric:  Speech and behavior appropriate       -------------------------------------------------- RESULTS -------------------------------------------------  I have personally reviewed all laboratory and imaging results for this patient. Results are listed below.      LABS:  Results for orders placed or performed during the hospital encounter of 07/21/21   CBC auto differential   Result Value Ref Range    WBC 4.3 (L) 4.5 - 11.5 E9/L    RBC 5.04 3.80 - 5.80 E12/L    Hemoglobin 15.0 12.5 - 16.5 g/dL    Hematocrit 43.5 37.0 - 54.0 %    MCV 86.3 80.0 - 99.9 fL    MCH 29.8 26.0 - 35.0 pg    MCHC 34.5 32.0 - 34.5 %    RDW 11.6 11.5 - 15.0 fL    Platelets 032 882 - 810 E9/L    MPV 9.0 7.0 - 12.0 fL    Neutrophils % 49.2 43.0 - 80.0 %    Immature Granulocytes % 0.2 0.0 - 5.0 %    Lymphocytes % 36.5 20.0 - 42.0 %    Monocytes % 12.0 2.0 - 12.0 %    Eosinophils % 1.4 0.0 - 6.0 % Basophils % 0.7 0.0 - 2.0 %    Neutrophils Absolute 2.09 1.80 - 7.30 E9/L    Immature Granulocytes # 0.01 E9/L    Lymphocytes Absolute 1.55 1.50 - 4.00 E9/L    Monocytes Absolute 0.51 0.10 - 0.95 E9/L    Eosinophils Absolute 0.06 0.05 - 0.50 E9/L    Basophils Absolute 0.03 0.00 - 0.20 E9/L   Comprehensive Metabolic Panel   Result Value Ref Range    Sodium 138 132 - 146 mmol/L    Potassium 4.2 3.5 - 5.0 mmol/L    Chloride 103 98 - 107 mmol/L    CO2 28 22 - 29 mmol/L    Anion Gap 7 7 - 16 mmol/L    Glucose 116 (H) 74 - 99 mg/dL    BUN 11 6 - 20 mg/dL    CREATININE 0.7 0.7 - 1.2 mg/dL    GFR Non-African American >60 >=60 mL/min/1.73    GFR African American >60     Calcium 9.7 8.6 - 10.2 mg/dL    Total Protein 7.3 6.4 - 8.3 g/dL    Albumin 4.6 3.5 - 5.2 g/dL    Total Bilirubin 0.5 0.0 - 1.2 mg/dL    Alkaline Phosphatase 74 40 - 129 U/L    ALT 88 (H) 0 - 40 U/L    AST 39 0 - 39 U/L   Lactic Acid, Plasma   Result Value Ref Range    Lactic Acid 1.0 0.5 - 2.2 mmol/L   Magnesium   Result Value Ref Range    Magnesium 2.0 1.6 - 2.6 mg/dL   Troponin   Result Value Ref Range    Troponin, High Sensitivity <6 0 - 11 ng/L   D-Dimer, Quantitative   Result Value Ref Range    D-Dimer, Quant <200 ng/mL DDU   Urinalysis with Microscopic   Result Value Ref Range    Color, UA Yellow Straw/Yellow    Clarity, UA Clear Clear    Glucose, Ur Negative Negative mg/dL    Bilirubin Urine Negative Negative    Ketones, Urine Negative Negative mg/dL    Specific Gravity, UA 1.020 1.005 - 1.030    Blood, Urine Negative Negative    pH, UA 6.0 5.0 - 9.0    Protein, UA Negative Negative mg/dL    Urobilinogen, Urine 0.2 <2.0 E.U./dL    Nitrite, Urine Negative Negative    Leukocyte Esterase, Urine Negative Negative    WBC, UA NONE 0 - 5 /HPF    RBC, UA NONE 0 - 2 /HPF    Bacteria, UA NONE SEEN None Seen /HPF   Serum Drug Screen   Result Value Ref Range    Ethanol Lvl <10 mg/dL    Acetaminophen Level <5.0 (L) 10.0 - 51.6 mcg/mL    Salicylate, Serum <7.2 0.0 - 30.0 mg/dL   EKG 12 Lead   Result Value Ref Range    Ventricular Rate 47 BPM    Atrial Rate 47 BPM    P-R Interval 188 ms    QRS Duration 106 ms    Q-T Interval 466 ms    QTc Calculation (Bazett) 412 ms    P Axis 9 degrees    R Axis 46 degrees    T Axis 41 degrees       RADIOLOGY:  Interpreted by Radiologist.  XR CHEST (2 VW)   Final Result   No acute process. EKG Interpretation  Time: 3:35 PM EDT  Rhythm: sinus bradycardia  Rate: 47  Axis: normal  Conduction: right bundle branch block (incomplete)  ST Segments: nonspecific changes  T Waves: non specific changes  Clinical Impression: non-specific EKG and sinus bradycardia  Comparison to prior EKG: no previous EKG      ------------------------- NURSING NOTES AND VITALS REVIEWED ---------------------------  The nursing notes within the ED encounter and vital signs as below have been reviewed by myself. BP (!) 152/100   Pulse 72   Temp 98 °F (36.7 °C) (Temporal)   Resp 16   Ht 6' 2\" (1.88 m)   Wt 270 lb (122.5 kg)   SpO2 98%   BMI 34.67 kg/m²   Oxygen Saturation Interpretation: Normal      The patients available past medical records and past encounters were reviewed. ------------------------------ ED COURSE/MEDICAL DECISION MAKING----------------------  Medications   0.9 % sodium chloride bolus (0 mLs Intravenous Stopped 7/21/21 1622)           Procedures:   none      Medical Decision Making:    Neg acute on workup, feels better. Sinus darrel on EKG. NSR upon discharge. BP was up on triage but down upon discharge (not documented by RN). Patient was explicitly instructed on specific signs and symptoms on which to return to the emergency room for. Patient was instructed to return to the ER for any new or worsening symptoms. Additional discharge instructions were given verbally. All questions were answered. Patient is comfortable and agreeable with discharge plan. Patient in no acute distress and non-toxic in appearance.        This patient's ED course included: re-evaluation prior to disposition and a personal history and physicial eaxmination    This patient has remained hemodynamically stable and improved during their ED course. Re-Evaluations:  Time: 4:45 PM EDT   Re-evaluation. Patients symptoms are improving  Repeat physical examination is not changed      Consultations:   none    Critical Care: none    I, Cyndee Little, DO, am the Primary Provider of Record    Counseling: The emergency provider has spoken with the patient and discussed todays results, in addition to providing specific details for the plan of care and counseling regarding the diagnosis and prognosis. Questions are answered at this time and they are agreeable with the plan.    --------------------------- IMPRESSION AND DISPOSITION ---------------------------------    IMPRESSION  1.  Anxiety        DISPOSITION  Disposition: Discharge to home  Patient condition is stable              Spenser Wilson DO  07/21/21 1072

## 2022-01-04 ENCOUNTER — APPOINTMENT (OUTPATIENT)
Dept: GENERAL RADIOLOGY | Age: 32
End: 2022-01-04
Payer: COMMERCIAL

## 2022-01-04 ENCOUNTER — APPOINTMENT (OUTPATIENT)
Dept: CT IMAGING | Age: 32
End: 2022-01-04
Payer: COMMERCIAL

## 2022-01-04 ENCOUNTER — HOSPITAL ENCOUNTER (EMERGENCY)
Age: 32
Discharge: HOME | End: 2022-01-04
Attending: EMERGENCY MEDICINE
Payer: COMMERCIAL

## 2022-01-04 VITALS
TEMPERATURE: 98.3 F | BODY MASS INDEX: 34.65 KG/M2 | WEIGHT: 270 LBS | SYSTOLIC BLOOD PRESSURE: 165 MMHG | OXYGEN SATURATION: 97 % | DIASTOLIC BLOOD PRESSURE: 95 MMHG | HEART RATE: 73 BPM | RESPIRATION RATE: 20 BRPM | HEIGHT: 74 IN

## 2022-01-04 DIAGNOSIS — K76.0 FATTY LIVER: ICD-10-CM

## 2022-01-04 DIAGNOSIS — R07.89 ATYPICAL CHEST PAIN: Primary | ICD-10-CM

## 2022-01-04 LAB
ALBUMIN SERPL-MCNC: 4.7 G/DL (ref 3.5–5.2)
ALP BLD-CCNC: 96 U/L (ref 40–129)
ALT SERPL-CCNC: 26 U/L (ref 0–40)
ANION GAP SERPL CALCULATED.3IONS-SCNC: 13 MMOL/L (ref 7–16)
AST SERPL-CCNC: 13 U/L (ref 0–39)
BASOPHILS ABSOLUTE: 0.04 E9/L (ref 0–0.2)
BASOPHILS RELATIVE PERCENT: 0.5 % (ref 0–2)
BILIRUB SERPL-MCNC: <0.2 MG/DL (ref 0–1.2)
BUN BLDV-MCNC: 15 MG/DL (ref 6–20)
CALCIUM SERPL-MCNC: 9.4 MG/DL (ref 8.6–10.2)
CHLORIDE BLD-SCNC: 100 MMOL/L (ref 98–107)
CO2: 26 MMOL/L (ref 22–29)
CREAT SERPL-MCNC: 0.8 MG/DL (ref 0.7–1.2)
D DIMER: 551 NG/ML DDU
EKG ATRIAL RATE: 69 BPM
EKG P AXIS: 44 DEGREES
EKG P-R INTERVAL: 200 MS
EKG Q-T INTERVAL: 390 MS
EKG QRS DURATION: 106 MS
EKG QTC CALCULATION (BAZETT): 417 MS
EKG R AXIS: 14 DEGREES
EKG T AXIS: 44 DEGREES
EKG VENTRICULAR RATE: 69 BPM
EOSINOPHILS ABSOLUTE: 0.05 E9/L (ref 0.05–0.5)
EOSINOPHILS RELATIVE PERCENT: 0.6 % (ref 0–6)
GFR AFRICAN AMERICAN: >60
GFR NON-AFRICAN AMERICAN: >60 ML/MIN/1.73
GLUCOSE BLD-MCNC: 104 MG/DL (ref 74–99)
HCT VFR BLD CALC: 42.4 % (ref 37–54)
HEMOGLOBIN: 15.4 G/DL (ref 12.5–16.5)
IMMATURE GRANULOCYTES #: 0.05 E9/L
IMMATURE GRANULOCYTES %: 0.6 % (ref 0–5)
LYMPHOCYTES ABSOLUTE: 2.15 E9/L (ref 1.5–4)
LYMPHOCYTES RELATIVE PERCENT: 24.6 % (ref 20–42)
MCH RBC QN AUTO: 31.3 PG (ref 26–35)
MCHC RBC AUTO-ENTMCNC: 36.3 % (ref 32–34.5)
MCV RBC AUTO: 86.2 FL (ref 80–99.9)
MONOCYTES ABSOLUTE: 0.74 E9/L (ref 0.1–0.95)
MONOCYTES RELATIVE PERCENT: 8.5 % (ref 2–12)
NEUTROPHILS ABSOLUTE: 5.7 E9/L (ref 1.8–7.3)
NEUTROPHILS RELATIVE PERCENT: 65.2 % (ref 43–80)
PDW BLD-RTO: 11.8 FL (ref 11.5–15)
PLATELET # BLD: 268 E9/L (ref 130–450)
PMV BLD AUTO: 8.6 FL (ref 7–12)
POTASSIUM REFLEX MAGNESIUM: 3.8 MMOL/L (ref 3.5–5)
PRO-BNP: 31 PG/ML (ref 0–125)
RBC # BLD: 4.92 E12/L (ref 3.8–5.8)
SODIUM BLD-SCNC: 139 MMOL/L (ref 132–146)
TOTAL PROTEIN: 7.5 G/DL (ref 6.4–8.3)
TROPONIN, HIGH SENSITIVITY: 7 NG/L (ref 0–11)
TROPONIN, HIGH SENSITIVITY: 7 NG/L (ref 0–11)
WBC # BLD: 8.7 E9/L (ref 4.5–11.5)

## 2022-01-04 PROCEDURE — 96374 THER/PROPH/DIAG INJ IV PUSH: CPT

## 2022-01-04 PROCEDURE — 85025 COMPLETE CBC W/AUTO DIFF WBC: CPT

## 2022-01-04 PROCEDURE — 6370000000 HC RX 637 (ALT 250 FOR IP): Performed by: EMERGENCY MEDICINE

## 2022-01-04 PROCEDURE — 6360000004 HC RX CONTRAST MEDICATION: Performed by: RADIOLOGY

## 2022-01-04 PROCEDURE — 80053 COMPREHEN METABOLIC PANEL: CPT

## 2022-01-04 PROCEDURE — 83880 ASSAY OF NATRIURETIC PEPTIDE: CPT

## 2022-01-04 PROCEDURE — 6360000002 HC RX W HCPCS: Performed by: EMERGENCY MEDICINE

## 2022-01-04 PROCEDURE — 93005 ELECTROCARDIOGRAM TRACING: CPT | Performed by: EMERGENCY MEDICINE

## 2022-01-04 PROCEDURE — 96375 TX/PRO/DX INJ NEW DRUG ADDON: CPT

## 2022-01-04 PROCEDURE — 99283 EMERGENCY DEPT VISIT LOW MDM: CPT

## 2022-01-04 PROCEDURE — 84484 ASSAY OF TROPONIN QUANT: CPT

## 2022-01-04 PROCEDURE — 71046 X-RAY EXAM CHEST 2 VIEWS: CPT

## 2022-01-04 PROCEDURE — 36415 COLL VENOUS BLD VENIPUNCTURE: CPT

## 2022-01-04 PROCEDURE — 85378 FIBRIN DEGRADE SEMIQUANT: CPT

## 2022-01-04 PROCEDURE — 71275 CT ANGIOGRAPHY CHEST: CPT

## 2022-01-04 RX ORDER — LORAZEPAM 2 MG/ML
1 INJECTION INTRAMUSCULAR ONCE
Status: COMPLETED | OUTPATIENT
Start: 2022-01-04 | End: 2022-01-04

## 2022-01-04 RX ORDER — ASPIRIN 81 MG/1
324 TABLET, CHEWABLE ORAL ONCE
Status: COMPLETED | OUTPATIENT
Start: 2022-01-04 | End: 2022-01-04

## 2022-01-04 RX ORDER — KETOROLAC TROMETHAMINE 30 MG/ML
30 INJECTION, SOLUTION INTRAMUSCULAR; INTRAVENOUS ONCE
Status: COMPLETED | OUTPATIENT
Start: 2022-01-04 | End: 2022-01-04

## 2022-01-04 RX ADMIN — KETOROLAC TROMETHAMINE 30 MG: 30 INJECTION, SOLUTION INTRAMUSCULAR; INTRAVENOUS at 05:58

## 2022-01-04 RX ADMIN — ASPIRIN 81 MG CHEWABLE TABLET 324 MG: 81 TABLET CHEWABLE at 05:57

## 2022-01-04 RX ADMIN — LORAZEPAM 1 MG: 2 INJECTION INTRAMUSCULAR; INTRAVENOUS at 05:59

## 2022-01-04 RX ADMIN — IOPAMIDOL 75 ML: 755 INJECTION, SOLUTION INTRAVENOUS at 07:54

## 2022-01-04 ASSESSMENT — PAIN SCALES - GENERAL
PAINLEVEL_OUTOF10: 10
PAINLEVEL_OUTOF10: 10

## 2022-01-04 ASSESSMENT — PAIN DESCRIPTION - ORIENTATION: ORIENTATION: LEFT

## 2022-01-04 ASSESSMENT — PAIN DESCRIPTION - LOCATION: LOCATION: CHEST

## 2022-01-04 ASSESSMENT — PAIN DESCRIPTION - PAIN TYPE: TYPE: ACUTE PAIN

## 2022-01-04 ASSESSMENT — PAIN DESCRIPTION - DESCRIPTORS: DESCRIPTORS: CRUSHING

## 2022-01-04 NOTE — ED PROVIDER NOTES
HPI:  1/4/22, Time: 6:51 AM SANTI Aly is a 33 y.o. male presenting to the ED for chest pain radiating to the back, beginning 4 hours ago. The complaint has been persistent, moderate in severity, and worsened by lying back. . Denies any IV drug use. He does take pain medications for chronic back pain. . Patient admits to chest pain,    Patient denies fever/chills, sore throat, cough, congestion,  shortness of breath, edema, headache, visual disturbances, focal paresthesias, focal weakness, abdominal pain, nausea, vomiting, diarrhea, constipation, dysuria, hematuria, trauma, neck or back pain, rash or other complaints. ROS:   A complete review of systems was performed and all pertinent positives and negatives are stated within HPI, all other systems reviewed and are negative.            --------------------------------------------- PAST HISTORY ---------------------------------------------  Past Medical History:  has a past medical history of Asthma, Back problem, and COVID-19. Past Surgical History:  has a past surgical history that includes Knee arthroscopy (Right). Social History:  reports that he has never smoked. He has quit using smokeless tobacco.  His smokeless tobacco use included chew. He reports previous alcohol use. He reports that he does not use drugs. Family History: family history includes Diabetes in his father. The patients home medications have been reviewed. Allergies: Patient has no known allergies. ----------------------------------------PHYSICAL EXAM--------------------------------------    Constitutional:  Well developed, Anxious appearing, no acute distress, non-toxic appearance   Eyes:  PERRL, conjunctiva normal, EOMI  HENT:  Atraumatic, external ears normal, nose normal, oropharynx moist, no pharyngeal exudates.  Neck- normal range of motion, no nuchal rigidity   Respiratory:  No respiratory distress, normal breath sounds, no rales, no wheezing Cardiovascular:  Normal rate, normal rhythm, no murmurs, no gallops, no rubs. Radial and DP pulses 2+ bilaterally. GI:  Soft, nondistended, normal bowel sounds, nontender, no organomegaly, no mass, no rebound, no guarding   :  No costovertebral angle tenderness   Musculoskeletal:  No edema, no tenderness, no deformities. Back- no tenderness  Integument:  Well hydrated, no rash. Adequate perfusion. Lymphatic:  No cervical lymphadenopathy noted   Neurologic:  Alert & oriented x 3, CN 2-12 normal, normal motor function, normal sensory function, no focal deficits noted. Normal gait. Psychiatric:  Speech and behavior appropriate       -------------------------------------------------- RESULTS -------------------------------------------------  I have personally reviewed all laboratory and imaging results for this patient. Results are listed below.      LABS:  Results for orders placed or performed during the hospital encounter of 01/04/22   Comprehensive Metabolic Panel w/ Reflex to MG   Result Value Ref Range    Sodium 139 132 - 146 mmol/L    Potassium reflex Magnesium 3.8 3.5 - 5.0 mmol/L    Chloride 100 98 - 107 mmol/L    CO2 26 22 - 29 mmol/L    Anion Gap 13 7 - 16 mmol/L    Glucose 104 (H) 74 - 99 mg/dL    BUN 15 6 - 20 mg/dL    CREATININE 0.8 0.7 - 1.2 mg/dL    GFR Non-African American >60 >=60 mL/min/1.73    GFR African American >60     Calcium 9.4 8.6 - 10.2 mg/dL    Total Protein 7.5 6.4 - 8.3 g/dL    Albumin 4.7 3.5 - 5.2 g/dL    Total Bilirubin <0.2 0.0 - 1.2 mg/dL    Alkaline Phosphatase 96 40 - 129 U/L    ALT 26 0 - 40 U/L    AST 13 0 - 39 U/L   Brain Natriuretic Peptide   Result Value Ref Range    Pro-BNP 31 0 - 125 pg/mL   Troponin   Result Value Ref Range    Troponin, High Sensitivity 7 0 - 11 ng/L   CBC auto differential   Result Value Ref Range    WBC 8.7 4.5 - 11.5 E9/L    RBC 4.92 3.80 - 5.80 E12/L    Hemoglobin 15.4 12.5 - 16.5 g/dL    Hematocrit 42.4 37.0 - 54.0 %    MCV 86.2 80.0 - 99.9 fL MCH 31.3 26.0 - 35.0 pg    MCHC 36.3 (H) 32.0 - 34.5 %    RDW 11.8 11.5 - 15.0 fL    Platelets 274 256 - 871 E9/L    MPV 8.6 7.0 - 12.0 fL    Neutrophils % 65.2 43.0 - 80.0 %    Immature Granulocytes % 0.6 0.0 - 5.0 %    Lymphocytes % 24.6 20.0 - 42.0 %    Monocytes % 8.5 2.0 - 12.0 %    Eosinophils % 0.6 0.0 - 6.0 %    Basophils % 0.5 0.0 - 2.0 %    Neutrophils Absolute 5.70 1.80 - 7.30 E9/L    Immature Granulocytes # 0.05 E9/L    Lymphocytes Absolute 2.15 1.50 - 4.00 E9/L    Monocytes Absolute 0.74 0.10 - 0.95 E9/L    Eosinophils Absolute 0.05 0.05 - 0.50 E9/L    Basophils Absolute 0.04 0.00 - 0.20 E9/L   D-Dimer, Quantitative   Result Value Ref Range    D-Dimer, Quant 551 ng/mL DDU   Troponin   Result Value Ref Range    Troponin, High Sensitivity 7 0 - 11 ng/L   EKG 12 Lead   Result Value Ref Range    Ventricular Rate 69 BPM    Atrial Rate 69 BPM    P-R Interval 200 ms    QRS Duration 106 ms    Q-T Interval 390 ms    QTc Calculation (Bazett) 417 ms    P Axis 44 degrees    R Axis 14 degrees    T Axis 44 degrees       RADIOLOGY:  Interpreted by Radiologist.  CTA PULMONARY W CONTRAST   Final Result   1. There is no pulmonary embolus. 2. The coronary arteries are well opacified with IV contrast.  No significant   plaque is noted and there is no pericardial effusion   3. There are no findings of atypical or COVID pneumonia. 4. Minimal right lung base atelectasis. 5. Hepatic steatosis. RECOMMENDATIONS:   Unavailable         XR CHEST (2 VW)   Final Result   No acute cardiopulmonary abnormality.                  EKG Interpretation by Me  Time: 0516  Rhythm: normal sinus   Rate: normal  Axis: normal  Conduction: right bundle branch block (incomplete)  ST Segments: no acute change  T Waves: no acute change  Clinical Impression: no acute changes  Comparison to prior EKG: stable as compared to patient's most recent EKG      ------------------------- NURSING NOTES AND VITALS REVIEWED ---------------------------  The nursing notes within the ED encounter and vital signs as below have been reviewed by myself. BP (!) 165/95   Pulse 73   Temp 98.3 °F (36.8 °C)   Resp 20   Ht 6' 2\" (1.88 m)   Wt 270 lb (122.5 kg)   SpO2 97%   BMI 34.67 kg/m²   Oxygen Saturation Interpretation: Normal      The patients available past medical records and past encounters were reviewed. ------------------------------ ED COURSE/MEDICAL DECISION MAKING----------------------  Medications   aspirin chewable tablet 324 mg (324 mg Oral Given 1/4/22 0557)   ketorolac (TORADOL) injection 30 mg (30 mg IntraVENous Given 1/4/22 4609)   LORazepam (ATIVAN) injection 1 mg (1 mg IntraVENous Given 1/4/22 0576)   iopamidol (ISOVUE-370) 76 % injection 75 mL (75 mLs IntraVENous Given 1/4/22 4720)          MEDICATIONS  Discharge Medication List as of 1/4/2022  9:31 AM                  Medical Decision Making:    Patient is 56-year-old male presented with chest pain sudden onset 10 out of 10 severity. He was given medication for treatment of this pain. In mild improvement. D-dimer was elevated and patient will be signed out to Dr. Anais Gaxiola pending CTA. This patient's ED course included: re-evaluation prior to disposition, multiple bedside re-evaluations, IV medications, cardiac monitoring, continuous pulse oximetry, complex medical decision making and emergency management and a personal history and physicial eaxmination    This patient has remained hemodynamically stable and been closely monitored during their ED course. Re-Evaluations:  Time: 0600   Re-evaluation. Patients symptoms show no change  Repeat physical examination is significantly improved          Counseling: The emergency provider has spoken with the patient and discussed todays results, in addition to providing specific details for the plan of care and counseling regarding the diagnosis and prognosis.   Questions are answered at this time and they are agreeable with the plan.    --------------------------- IMPRESSION AND DISPOSITION ---------------------------------    IMPRESSION  1. Atypical chest pain    2.  Fatty liver        DISPOSITION  Disposition: Discharge to home  Patient condition is good             Mirian Juares DO  Resident  01/05/22 8971

## 2022-01-04 NOTE — ED PROVIDER NOTES
9:30 AM EST  Patient care turned over to me by Dr. Brice Mary pending CTA of the chest.  His CTA of the chest is negative for pulmonary embolism. It is positive for fatty liver as an incidental finding. He was given information on this and follow-up with primary care physician. Time of discharge he is sleeping and was arousable upon discharge. He is asymptomatic. Neurovascularly intact and ambulatory upon discharge. Patient was explicitly instructed on specific signs and symptoms on which to return to the emergency room for. Patient was instructed to return to the ER for any new or worsening symptoms. Additional discharge instructions were given verbally. All questions were answered. Patient is comfortable and agreeable with discharge plan. Patient in no acute distress and non-toxic in appearance. --------------------------------- IMPRESSION AND DISPOSITION ---------------------------------    IMPRESSION  1. Atypical chest pain    2.  Fatty liver        DISPOSITION  Disposition: Discharge to home  Patient condition is stable       Oneal Ruffin DO  01/04/22 0932

## 2022-05-18 ENCOUNTER — HOSPITAL ENCOUNTER (OUTPATIENT)
Dept: PSYCHIATRY | Age: 32
Setting detail: THERAPIES SERIES
Discharge: HOME | End: 2022-05-18
Payer: COMMERCIAL

## 2022-05-18 DIAGNOSIS — F11.20 UNCOMPLICATED OPIOID DEPENDENCE (HCC): Primary | ICD-10-CM

## 2022-05-18 PROCEDURE — 90792 PSYCH DIAG EVAL W/MED SRVCS: CPT | Performed by: PSYCHIATRY & NEUROLOGY

## 2022-05-18 RX ORDER — BUPRENORPHINE AND NALOXONE 8; 2 MG/1; MG/1
1 FILM, SOLUBLE BUCCAL; SUBLINGUAL DAILY
Qty: 7 FILM | Refills: 0 | Status: SHIPPED | OUTPATIENT
Start: 2022-05-18 | End: 2022-05-25

## 2022-05-18 NOTE — BH NOTE
CHIEF COMPLAINT: \"I don't know\"    HISTORY OF PRESENT ILLNESS:  Says he has been prescribed Percocet 4-5 years by a Dr Lynn Fees He reports that he tested positive last month and did a drug screen. Tested pos for Vicodin and Percocet. Was told by Pain Clinic he was being dropped at the end of April was being dropped as a pt. Was told to ween himself off with his remaining Percocet and he ran out of Percocet 4 days ago. Reports he was down to 2 to 2.5 tabs a day for  1 week. Ran out. And has been taking Vicodin 5mg 2-3 times a day. His mother in law who works here referred him. He reports he was also getting epidural steroids and facet injections as well left L4-S1 neural ablation. Current Drug Use Including Type/Method of Ingestion/Frequency   Last opioid was 5mg Vicodin yesterday morning. Past Use  Opioids- Percocet 10mg QID 5 years. Started by Dr Charmaine Mcallister for CLBP but denies it had been used for CP prior. Denies having been prescribed other opioids     Denies issues running out of meds early or not taking them as prescribed. Says he had not had a pattern of using wife's Vicodin prior to this incident which lead to [address] termination     BNZ/Sedatives- No  Cocaine- No  Amphetamines-  no  THC- no  Hallucinogens- no  Alcohol- Denies ever having been much of an alcohol user no use 3 years   Tobacco-Vapes Jewel  3 years     DX Criteria for OUD    1) Opioids are often taken in larger amounts or over a longer period of time than intended. Y on rare occasion      2) There is a persistent desire or unsuccessful efforts to cut down or control opioid use. Y 3 x    3) A great deal of time is spent in activities necessary to obtain the opioid, use the opioid, or recover from its effects. N    4) Craving, or a strong desire to use opioids. N    5) Recurrent opioid use resulting in failure to fulfill major role obligations at work, school or home.   Yes missed work when he tried to cold turkey    6) Continued opioid use despite having persistent or recurrent social or interpersonal problems caused or exacerbated by the effects of opioids. N    7)  Important social, occupational or recreational activities are given up or reduced because of opioid use. N    8) Recurrent opioid use in situations in which it is physically hazardous N    9) Continued use despite knowledge of having a persistent or recurrent physical or psychological problem that is likely to have been caused or exacerbated by opioids. N    10) Tolerance, as defined by either of the following: (a) a need for markedly increased amounts of opioids to achieve intoxication or desired effect (b) markedly diminished effect with continued use of the same amount of an opioid  *Withdrawal, as manifested by either of the following: (a) the characteristic opioid withdrawal syndrome (b) the same (or a closely related) substance are taken to relieve or avoid withdrawal symptoms        Admits around 50% tolerance     Total Number Boxes Checked Yes:  _______2-3__________  Severity: Mild: 2-3 symptoms. Moderate: 4-5 symptoms.   Severe: 6 or more symptoms       Past ROLAND [place]    In PT Rehab- No    Out Pt/MAT- No    Residential-No    PAST PSYCHIATRIC HISTORY:      No    PAST MEDICAL HISTORY:   Lumbar disc disease    DDD (degenerative disc disease), cervical    Post-traumatic osteoarthritis of right knee    Sciatic leg pain    Right hip joint effusion                Hepatitis Testing- reports screening 5 years neg    HIV testing- neg 5 plus years ago    MEDICAL ROS:   No    PAST SURGICAL HISTORY:  Rt ACL/MCL meniscus repair 2003    MEDICATIONS        oxyCODONE-acetaminophen (PERCOCET)  MG per tablet     ibuprofen (ADVIL;MOTRIN) 400 MG tablet     ibuprofen (IBU) 800 MG tablet Take 1 tablet by mouth every 6 hours as needed for Pain    lidocaine (LIDODERM) 5 % Place 1 patch onto the skin daily 12 hours on, 12 hours off.          PDMP  04/15/2022  04/13/2022   1  Qsymia 3.75 Mg-23 Mg Capsule   30.00  30  Gr Fac  [id number]  Wal ([id number])  0   Comm Ins  [facility]     04/08/2022 04/04/2022   1  Oxycodone-Acetaminophen    120.00  30  Ge And  [id number]  Wex ([id number])  0  60.00 MME  Comm Ins  [facility]     03/09/2022 03/07/2022   1  Oxycodone-Acetaminophen    120.00  30  Ge And  [id number]  Wex ([id number])  0  60.00 MME  Comm Ins  [facility]     02/09/2022 02/07/2022   1  Oxycodone-Acetaminophen    120.00  30  Ge And  [id number]  Wex ([id number])  0  60.00 MME  Comm Ins           ALLERGIES:  NKDA    FAMILY ROLAND/ PSYCHIATRIC HISTORY:    No alcohol or drug abuse  Denies Suicide or major psych illness     SOCIAL HISTORY:   Developmental-Born in [place] 1 of 3 boys  Father -restaurant Mother Restaurant. Moved to [facility] age 6. Grad [employer] 2006. College [employer]. [employer] 2.5 years Kinesiology. Started working [employer]     Maltreatment/Abuse HX- none  Education- above  Marital- 5 years   Children- 2 y/o new born. 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 [employer]     Resting Pulse Rate: beats/minute Measured after patient is sitting or lying for one minute   0 pulse rate 80 or below    1 pulse rate - 85 /90   2 pulse rate 101-120    4 pulse rate greater than 120      GI Upset: over last 1/2  hour    0 no GI symptoms -0   1 stomach cramps    2 nausea or loose stool   3 vomiting or diarrhea    5 multiple episodes of diarrhea or vomiting      Sweating: over past 1/2  hour not accounted for by room temperature or patient activity.     0 no report of chills or flushing   1  subjective report of chills or flushing    2 flushed or observable moistness on  face  -2   3 beads of sweat on brow or face    4 sweat streaming off face     Tremor observation of outstretched hands    0 no tremor    1 tremor can be felt, but not observed -1   2 slight tremor observable    4 gross tremor or muscle twitching     Restlessness Observation during assessment   0 able to sit still   1 reports difficulty sitting still, but is able to do so -1  3 frequent shifting or extraneous movements of legs/arms   5 unable to sit still for more than a few seconds     Yawning Observation during assessment    0 no yawning -0   1 yawning once or twice during assessment    2 yawning three or more times during assessment    4 yawning several times/minute     Pupil size   0 pupils pinned or normal size for room light    1  pupils possibly larger than normal for room light -1   2 pupils moderately dilated    5 pupils so dilated that only the rim of the iris is visible     Anxiety or Irritability   0 none    1 patient reports increasing irritability or anxiousness    2 patient obviously irritable or anxious -2   4 patient so irritable or anxious that participation in the assessment is difficult     Bone or Joint aches if patient was having pain previously, only the additional component attributed to opiates withdrawal is scored   0 not present   1  mild diffuse discomfort -1  2 patient reports severe diffuse aching of joints/muscles   4 patient is rubbing joints or muscles and is unable to sit still because of discomfort     Gooseflesh skin  0 skin is smooth -0  3 piloerrection of skin can be felt or hairs standing up on arms   5 prominent piloerrection     Runny nose or tearing Not accounted for by cold -symptoms or allergies   0 not present   1  nasal stuffiness or unusually moist eyes   2 nose running or tearing -2  4 nose constantly running or tears streaming down cheeks     Total Score The total score is the sum of all 11 items Initials of person completing assessment:   Score: 5-12 = mild; -10-11   13-24 = moderate;    25-36 = moderately severe;    more than 36 = severe withdrawal     LABS:   Oxy/Opi    Other Tests:       MENTAL STATUS EXAMINATION  Appearance: Large WM well groomed golf shirt blacks    Behavior: cooperative   Mood: anxious    Affect: restricted    Speech: nl rate tone   Thought Process: organized   Thought Content: help seeking    Perception: nl  Orientation: AOx3  Concentration: nl   Memory: short and long term in tact  Insight: fair       ASSESSMENT:   1) Opioid Dep uncomplicated  2) Opioid Withdrawal   3) CLBP  PLAN & RECOMMENDATIONS:    Discussed TX plan at length  1) Pt is opioid dependent not clearly addicted but is in withdrawal and this needs addressed. It has been over 24 hrs since last Vicodin. I suggested we start trial of Suboxone 2mg BID to QID depending on response. He had been on 40 mg of Oxycodone up till last week, I would use 8mg film and have him cut this up into qrt pieces. I educated him on sublingual;l use and rinsing mouth with water before and after. Advised getting several opinions including Pain Clinic was reasonable but that he was over wt and without PT and development of true core strength and flexibility he would not get better. Drs doing procedures and meds would not fix this just mask the symptoms. He thinks he would like to taper off opioids and long term I think this is lu for him and would do so slowly with Suboxone. He will return to see me in 1 week. I advised he call with any problems and return in 1 week and he agreed. Will forward PT videos as well.      Time spent 75 mins

## 2022-05-25 ENCOUNTER — HOSPITAL ENCOUNTER (OUTPATIENT)
Dept: PSYCHIATRY | Age: 32
Setting detail: THERAPIES SERIES
Discharge: HOME | End: 2022-05-25
Payer: COMMERCIAL

## 2022-05-25 DIAGNOSIS — F11.20 OPIOID DEPENDENCE ON AGONIST THERAPY (HCC): Primary | ICD-10-CM

## 2022-05-25 PROCEDURE — 99214 OFFICE O/P EST MOD 30 MIN: CPT | Performed by: PSYCHIATRY & NEUROLOGY

## 2022-05-25 RX ORDER — BUPRENORPHINE AND NALOXONE 8; 2 MG/1; MG/1
1.25 FILM, SOLUBLE BUCCAL; SUBLINGUAL DAILY
Qty: 27 FILM | Refills: 0 | Status: SHIPPED | OUTPATIENT
Start: 2022-05-25 | End: 2022-06-15

## 2022-05-25 NOTE — PROGRESS NOTES
Addiction Psychiatry Follow Up Note    Recent past HX  Says he has been prescribed Percocet 4-5 years by a Dr [place] He reports that he tested positive last month and did a drug screen. Tested pos for Vicodin and Percocet. Was told by Pain Clinic he was being dropped at the end of April was being dropped as a pt. Was told to ween himself off with his remaining Percocet and he ran out of Percocet 4 days ago. Reports he was down to 2 to 2.5 tabs a day for  1 week. Ran out. And has been taking Vicodin 5mg 2-3 times a day.      His mother in law who works here referred him. He reports he was also getting epidural steroids and facet injections as well left L4-S1 neural ablation. Current Self Report Status  Reports he used medication ran out yesterday. Was using 1 and 1/4 strip a day. No withdrawal after first dose  Says he felt somewhat foggy. Had pain. Describes pain as being greater   Than in past    Drug/Alcohol Relapse  denies    PDMP report   done last visit 1 week    Medications   Suboxone 8mg/2mg 1 1/4 QD     Urine Tox/Immunoassay   def    MSE  Casual dress groomed. Speech is spontaneous   Thoughts are organized  Mood- somewhat irritable  Cognition is nl  Memory in tact. Assessment   Opioid Dependence on Agonist Therapy   Chronic LB Pain     Plan  Will cont Suboxone at 1/4 strip QD the next 3 weeks and have him return. Suggested he take time and consider this and decide if he wants to remain on this med or have me taper him off which I said I would do but made it clear he should taper over time 2-3 months likely. He agreed. Has not had much help from [facility] doc and feels he has been treated poorly and judged which I told him I know happens. Restated if he gets better it will be because of what he does not what any Dr does to him or meds specifically.  Needs wt loss and work on core strength and spinal health     Time spent 25 mins TX plan and discussion

## 2022-06-15 ENCOUNTER — TELEPHONE (OUTPATIENT)
Dept: PSYCHIATRY | Age: 32
End: 2022-06-15

## 2022-06-22 ENCOUNTER — HOSPITAL ENCOUNTER (OUTPATIENT)
Dept: PSYCHIATRY | Age: 32
Setting detail: THERAPIES SERIES
Discharge: HOME | End: 2022-06-22
Payer: COMMERCIAL

## 2022-06-22 DIAGNOSIS — F11.20 OPIOID DEPENDENCE ON AGONIST THERAPY (HCC): Primary | ICD-10-CM

## 2022-06-22 PROCEDURE — 99214 OFFICE O/P EST MOD 30 MIN: CPT | Performed by: PSYCHIATRY & NEUROLOGY

## 2022-06-22 RX ORDER — BUPRENORPHINE AND NALOXONE 2; .5 MG/1; MG/1
1 FILM, SOLUBLE BUCCAL; SUBLINGUAL DAILY
Qty: 30 FILM | Refills: 0 | Status: SHIPPED | OUTPATIENT
Start: 2022-06-22 | End: 2022-07-22

## 2022-06-22 NOTE — PROGRESS NOTES
Addiction/Pain Psychiatry Follow Up Note  Says he has been prescribed Percocet 4-5 years by a Dr Neno Deal He reports that he tested positive last month and did a drug screen. Tested pos for Vicodin and Percocet. Was told by Pain Clinic he was being dropped at the end of April was being dropped as a pt. Was told to ween himself off with his remaining Percocet and he ran out of Percocet 4 days ago. Reports he was down to 2 to 2.5 tabs a day for  1 week. Ran out. And has been taking Vicodin 5mg 2-3 times a day.      His mother in law who works here referred him. He reports he was also getting epidural steroids and facet injections as well left L4-S1 neural ablation.      Recent past HX  Will cont Suboxone at 1/4 strip QD the next 3 weeks and have him return. Suggested he take time and consider this and decide if he wants to remain on this med or have me taper him off which I said I would do but made it clear he should taper over time 2-3 months likely. He agreed. Has not had much help from [facility] doc and feels he has been treated poorly and judged which I told him I know happens. Restated if he gets better it will be because of what he does not what any Dr does to him or meds specifically. Needs wt loss and work on core strength and spinal health       Current Self Report Status   Reports he has cut dose down. Taking 1/2 down 1/4 strip mid day. He does not feel sick but does report some increase in yawning. He takes his dose after work . We discussed his plan and  That his goal his taper off altogether.    We discussed taper approach and if he experiences significant withdrawal he should slow dose     Drug/Alcohol Relapse      PDMP report    05/25/2022 05/25/2022   1  Buprenorphine-Nalox 8-2mg Film   27.00  21  Rozann Daunt  [id number]  Wex ([id number])  0  10.29 mg  Comm Ins  [place]     05/18/2022 05/18/2022   1  Buprenorphine-Nalox 8-2mg Film   7.00  7  Rozann Daunt  [id number]  Wex ([id number])  0  8.00 mg  Comm Ins  [place]     04/15/2022 04/13/2022   1  Qsymia 3.75 Mg-23 Mg Capsule   30.00  30  Gr Fac  [id number]  Wal ([id number])  0   Comm Ins  [place]          Medications    buprenorphine-naloxone (SUBOXONE) 8-2 MG FILM SL film Place 1.25 Film under the tongue daily for 7 days. ibuprofen (ADVIL;MOTRIN) 400 MG tablet     ibuprofen (IBU) 800 MG tablet Take 1 tablet by mouth every 6 hours as needed for Pain    lidocaine (LIDODERM) 5 % Place 1 patch onto the skin daily 12 hours on, 12 hours off. Urine Tox/Immunoassay   Bupe only      MSE  Casual dress   Well groomed  Speech is spontaneous nl rate tone  Affect is full  Mood is fair  Thoughts organized forward thinking  Cognition and memory intact. Assessment   Opioid Dep on [address] [facility] has made significant progress on his own with taper. I support full taper as he does not have hx of OUD and was prescribed opioids only for CLBP which is not a good indication. I advised we change to 2mg film and he take 1mg am and pm and reduce dose by 0.25mg ever 3-4 days as he would like to be off in 4 weeks. Made it clear last 2mg may be most challenging and he may need to go more slowly. Either way he will contact me in 4 weeks to let me know if he is successfully off or we need to reschedule another visit.  Regardless the goal is complete taper and I suspect he will manage this successfully    Time spent 30 mins

## 2022-07-29 DIAGNOSIS — F11.20 OPIOID DEPENDENCE ON AGONIST THERAPY (HCC): Primary | ICD-10-CM

## 2022-07-29 RX ORDER — BUPRENORPHINE AND NALOXONE 2; .5 MG/1; MG/1
0.5 FILM, SOLUBLE BUCCAL; SUBLINGUAL DAILY
Qty: 3 FILM | Refills: 0 | Status: SHIPPED | OUTPATIENT
Start: 2022-07-29 | End: 2022-08-04

## 2022-07-29 NOTE — TELEPHONE ENCOUNTER
Pt called saying he had tapered down but was not quite off the the Suboxone film asking for more medication. I told him I would need to see him in the office and he was scheduled for next Tuesday in 4 days.   I sent if 2mg Suboxone film 1/2 QD and will re-evaluate next week

## 2022-08-02 ENCOUNTER — HOSPITAL ENCOUNTER (OUTPATIENT)
Dept: PSYCHIATRY | Age: 32
Setting detail: THERAPIES SERIES
Discharge: HOME | End: 2022-08-02
Payer: COMMERCIAL

## 2022-08-02 DIAGNOSIS — F11.20 OPIOID DEPENDENCE ON AGONIST THERAPY (HCC): Primary | ICD-10-CM

## 2022-08-02 PROCEDURE — 99214 OFFICE O/P EST MOD 30 MIN: CPT | Performed by: PSYCHIATRY & NEUROLOGY

## 2022-08-04 RX ORDER — BUPRENORPHINE AND NALOXONE 2; .5 MG/1; MG/1
0.5 FILM, SOLUBLE BUCCAL; SUBLINGUAL DAILY
Qty: 15 FILM | Refills: 0 | Status: SHIPPED | OUTPATIENT
Start: 2022-08-04 | End: 2022-09-03

## 2022-08-04 NOTE — PROGRESS NOTES
Addiction/Pain Psychiatry Follow Up Note  Says he has been prescribed Percocet 4-5 years by a Dr Brenda Mohr He reports that he tested positive last month and did a drug screen. Tested pos for Vicodin and Percocet. Was told by Pain Clinic he was being dropped at the end of April was being dropped as a pt. Was told to ween himself off with his remaining Percocet and he ran out of Percocet 4 days ago. Reports he was down to 2 to 2.5 tabs a day for  1 week. Ran out. And has been taking Vicodin 5mg 2-3 times a day. His mother in law who works here referred him. He reports he was also getting epidural steroids and facet injections as well left L4-S1 neural ablation. Recent past HX  Will cont Suboxone at 1/4 strip QD the next 3 weeks and have him return. Suggested he take time and consider this and decide if he wants to remain on this med or have me taper him off which I said I would do but made it clear he should taper over time 2-3 months likely. He agreed. Has not had much help from [facility] doc and feels he has been treated poorly and judged which I told him I know happens. Restated if he gets better it will be because of what he does not what any Dr does to him or meds specifically. Needs wt loss and work on core strength and spinal health   Recent past HX    Reports he has cut dose down. Taking 1/2 down 1/4 strip mid day. He does not feel sick but does report some increase in yawning. He takes his dose after work . We discussed his plan and  That his goal his taper off altogether. We discussed taper approach and if he experiences significant withdrawal he should slow dose        Current Self Report Status  Pt reports he is down to 1/2 of a 2mg strip daily. Struggles with pain at times  We discussed at length his work schedule and frustrations over not being able to find help consistently. He works 10-12 hour days weekdays and 3/4 to 1/2 days on weekends working 7 days a week.     I discussed with him my concern over this and his health as well as how this level of physical strain can impair medication taper. 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 Drug/Alcohol Relapse  No    PDMP report      07/31/2022 07/29/2022   1  Buprenorphine-Nalox 2-0.5mg Fm   3.00  6  Dori Master  [id number]  Wex ([id number])  0  1.00 mg  Comm Ins  [place]     06/22/2022 06/22/2022   1  Buprenorphine-Nalox 2-0.5mg Fm   30.00  30  Dori Master  [id number]  Wex ([id number])  0  2.00 mg  Comm Ins  [place]     05/25/2022 05/25/2022   1  Buprenorphine-Nalox 8-2mg Film   27.00  21  Dori Master  [id number]  Wex ([id number])  0  10.29 mg  Comm Ins  [place]      Medications   Suboxone film 2mg 1/2 QD  Urine Tox/Immunoassay   def    MSE  Appearance- phone appointment  Speech- nl rate tone  Mood- fair not depressed but frustrated as stated above   Thought content- nl, remains motivated and hopeful about future  Cognition and memory- nl no deficits   Insight- good      Assessment   Opioid Use Disorder on agonist TX taper stage  CLBP    Plan    Discussed need to make sure his approach to managing taper was very rigid. Taking 0.5mg BID at present of 1/4 film BID. Advised when he was ready to cut am or pm dose by 50% to 0.25 which whould take total dose from 1mg QD to 0.75 mg QD then after 2-3 weeks if he felt ready reduce to 0.25mg BID. We would continue with these reductions as tolerated even down to 0.125mg QD with film and if he had issue stopping this consider low dose patch of buprenorphine to finish taper.    We will have f/u in 30 days     Time spent 30 mins

## 2022-08-31 ENCOUNTER — HOSPITAL ENCOUNTER (OUTPATIENT)
Dept: PSYCHIATRY | Age: 32
Setting detail: THERAPIES SERIES
Discharge: HOME | End: 2022-08-31
Payer: COMMERCIAL

## 2022-08-31 DIAGNOSIS — F11.20 OPIOID DEPENDENCE ON AGONIST THERAPY (HCC): Primary | ICD-10-CM

## 2022-08-31 PROCEDURE — 99214 OFFICE O/P EST MOD 30 MIN: CPT | Performed by: PSYCHIATRY & NEUROLOGY

## 2022-08-31 RX ORDER — BUPRENORPHINE AND NALOXONE 2; .5 MG/1; MG/1
0.5 FILM, SOLUBLE BUCCAL; SUBLINGUAL DAILY
Qty: 15 FILM | Refills: 0 | Status: SHIPPED | OUTPATIENT
Start: 2022-08-31 | End: 2022-09-30

## 2022-08-31 NOTE — PROGRESS NOTES
Addiction/Pain Psychiatry Follow Up Note  Says he has been prescribed Percocet 4-5 years by a Dr Lisa Restrepo He reports that he tested positive last month and did a drug screen. Tested pos for Vicodin and Percocet. Was told by Pain Clinic he was being dropped at the end of April was being dropped as a pt. Was told to ween himself off with his remaining Percocet and he ran out of Percocet 4 days ago. Reports he was down to 2 to 2.5 tabs a day for  1 week. Ran out. And has been taking Vicodin 5mg 2-3 times a day. His mother in law who works here referred him. He reports he was also getting epidural steroids and facet injections as well left L4-S1 neural ablation. Recent past HX  Will cont Suboxone at 1/4 strip QD the next 3 weeks and have him return. Suggested he take time and consider this and decide if he wants to remain on this med or have me taper him off which I said I would do but made it clear he should taper over time 2-3 months likely. He agreed. Has not had much help from [facility] doc and feels he has been treated poorly and judged which I told him I know happens. Restated if he gets better it will be because of what he does not what any Dr does to him or meds specifically. Needs wt loss and work on core strength and spinal health   Recent past HX     Reports he has cut dose down. Taking 1/2 down 1/4 strip mid day. He does not feel sick but does report some increase in yawning. He takes his dose after work . We discussed his plan and  That his goal his taper off altogether. We discussed taper approach and if he experiences significant withdrawal he should slow dose      Recent past HX  Pt reports he is down to 1/2 of a 2mg strip daily. Struggles with pain at times  We discussed at length his work schedule and frustrations over not being able to find help consistently. He works 10-12 hour days weekdays and 3/4 to 1/2 days on weekends working 7 days a week.     I discussed with him my concern over this and his health as well as how this level of physical strain can impair medication taper. 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 Current Self Report Status  Happy he is fully staffed\"   He did divide it up in 1/4 strips and is down to 1/4 strip BID. 0.5mg BID 6 days   Drug/Alcohol Relapse  no    PDMP report   Filled  Written  Sold  ID  Drug  QTY  Days  Prescriber  RX #  Dispenser  Refill  Daily Dose*  Pymt Type       08/04/2022 08/04/2022   1  Buprenorphine-Nalox 2-0.5mg Fm 15.00  30  [facility]  [id number]  Wex ([id number])  0  1.00 mg  Comm Ins  [place]     07/31/2022 07/29/2022   1  Buprenorphine-Nalox 2-0.5mg Fm 3.00  6  [facility]  [id number]  Wex ([id number])  0  1.00 mg  [place]  [place]        Medications   Medications                  buprenorphine-naloxone (SUBOXONE) 2-0.5 MG FILM SL film Place 0.5 Film under the tongue in the morning for 30 days. ibuprofen (ADVIL;MOTRIN) 400 MG tablet     ibuprofen (IBU) 800 MG tablet Take 1 tablet by mouth every 6 hours as needed for Pain    lidocaine (LIDODERM) 5 % Place 1 patch onto the skin daily 12 hours on, 12 hours off. Urine Tox/Immunoassay   Not done in several months secondary to near completion of taper    MSE  Appearance well groomed   Speech- nl  Mood- good   Thought content- nl  Cognition and memorynl  Insight- good       Assessment   OUD in remission     Plan  Cont 0.5mg BID for 7 days then go down to . 025mg TID 14 days then BID 14 days  Will call me in 4 weeks to report in but we will cont to . 25mg or . 125mg BID then QD if needed    May need short term BNZ for restlessness insomnia at the end of taper but I applauded his diligence and success!      Time spent 30 mins

## 2022-10-17 RX ORDER — BUPRENORPHINE AND NALOXONE 2; .5 MG/1; MG/1
0.5 FILM, SOLUBLE BUCCAL; SUBLINGUAL DAILY
Qty: 4 FILM | Refills: 0 | Status: SHIPPED | OUTPATIENT
Start: 2022-10-17 | End: 2022-10-25

## 2022-10-26 ENCOUNTER — HOSPITAL ENCOUNTER (OUTPATIENT)
Dept: PSYCHIATRY | Age: 32
Setting detail: THERAPIES SERIES
Discharge: HOME | End: 2022-10-26
Payer: COMMERCIAL

## 2022-10-26 DIAGNOSIS — F41.9 ANXIETY: ICD-10-CM

## 2022-10-26 DIAGNOSIS — I15.9 SECONDARY HYPERTENSION: ICD-10-CM

## 2022-10-26 DIAGNOSIS — F11.20 OPIOID DEPENDENCE ON AGONIST THERAPY (HCC): Primary | ICD-10-CM

## 2022-10-26 PROBLEM — F11.93 OPIOID WITHDRAWAL (HCC): Status: ACTIVE | Noted: 2022-10-26

## 2022-10-26 PROCEDURE — 99215 OFFICE O/P EST HI 40 MIN: CPT | Performed by: PSYCHIATRY & NEUROLOGY

## 2022-10-26 RX ORDER — LORAZEPAM 0.5 MG/1
0.5 TABLET ORAL EVERY 6 HOURS PRN
Qty: 60 TABLET | Refills: 0 | Status: SHIPPED | OUTPATIENT
Start: 2022-10-26 | End: 2022-11-25

## 2022-10-26 RX ORDER — CLONIDINE HYDROCHLORIDE 0.1 MG/1
0.1 TABLET ORAL 4 TIMES DAILY PRN
Qty: 60 TABLET | Refills: 0 | Status: SHIPPED | OUTPATIENT
Start: 2022-10-26 | End: 2022-11-10

## 2022-10-26 RX ORDER — BUPRENORPHINE AND NALOXONE 2; .5 MG/1; MG/1
0.5 FILM, SOLUBLE BUCCAL; SUBLINGUAL DAILY
Qty: 15 FILM | Refills: 0 | Status: SHIPPED | OUTPATIENT
Start: 2022-10-26 | End: 2022-11-25

## 2022-10-26 NOTE — BH NOTE
Addiction Psychiatry/Pain Follow Up Note    Past HX  Says he has been prescribed Percocet 4-5 years by a Dr Jorge Duran He reports that he tested positive last month and did a drug screen. Tested pos for Vicodin and Percocet. Was told by [facility] he was being dropped at the end of April was being dropped as a pt. Was told to ween himself off with his remaining Percocet and he ran out of Percocet 4 days ago. Reports he was down to 2 to 2.5 tabs a day for  1 week. Ran out. And has been taking Vicodin 5mg 2-3 times a day. His mother in law who works here referred him. He reports he was also getting epidural steroids and facet injections as well left L4-S1 neural ablation. Recent past HX  Will cont Suboxone at 1/4 strip QD the next 3 weeks and have him return. Suggested he take time and consider this and decide if he wants to remain on this med or have me taper him off which I said I would do but made it clear he should taper over time 2-3 months likely. He agreed. Has not had much help from [facility] doc and feels he has been treated poorly and judged which I told him I know happens. Restated if he gets better it will be because of what he does not what any Dr does to him or meds specifically. Needs wt loss and work on core strength and spinal health   Recent past HX     Reports he has cut dose down. Taking 1/2 down 1/4 strip mid day. He does not feel sick but does report some increase in yawning. He takes his dose after work . We discussed his plan and  That his goal his taper off altogether. We discussed taper approach and if he experiences significant withdrawal he should slow dose              Current HX  Reports average daily dose is 0.25mg 1-2x day  Having insomnia   If 1x day gets sneezing  A lot of anxiety with 1x day dosing     Some chest tightness at night.      No opioid cravings     Working out     [facility] him feedback praising him     PDMP   10/17/2022  10/17/2022   1  Buprenorphine-Nalox 2-0.5mg Fm 4.00  8  Durenda Reil  [id number]   Wex (0636)  0  1.00 mg  Comm Ins  [place]     08/31/2022 08/31/2022   1  Buprenorphine-Nalox 2-0.5mg Fm 15.00  30  Renata Sor  [id number]   Wex (8806)  0  1.00 mg  [place] Ins  [place]      Labs  none    MSE  Appearance- Casual dress   Speech-nl rate   Mood- somewhat depressed low energy.   Thought Content-  Process/organization-nl  Perception-nl  Cognition/Memory- nl  Insight- good    PE    Vitals- 140/90   Head- NC AT EOMI Pupils nl  Chest- RRR  Abd- NT  Ext-NL  Skin- mild flushing face dry  Neuro- nl         Assessment  1) Opioid dep on agonist TX tapering   2) Experiencing withdrawal including HTN  3) Anxiety mild depression but remains optimistic and future focused about finally finishing taper and moving on       Plan  1) Advised he cut strip in 1/2 1mg piece and dissolve in 10ml water using syringe   He will administer 1ml sublingual QID for total dose of .4mg QD 1 week then decrease to TID and son on each week over next month to facilitate taper and give smaller doses of Bupe more regular basis     2) Clonidine 0.1mg q3-4 hrs prn    Discussed need to hydrate and if he feels light head to hpold dose but he could use this throughout day if he had withdrawal symptoms    3) Ativan 0.5mg 1-2 qhs or during day if needed for anxiety and insomnia     He will text me in 1-2 days to report status    RTC TBD    Time spent 45 mins

## 2023-01-28 ENCOUNTER — HOSPITAL ENCOUNTER (EMERGENCY)
Age: 33
Discharge: HOME | End: 2023-01-28
Attending: EMERGENCY MEDICINE
Payer: COMMERCIAL

## 2023-01-28 VITALS
TEMPERATURE: 98.5 F | HEART RATE: 81 BPM | WEIGHT: 285 LBS | SYSTOLIC BLOOD PRESSURE: 162 MMHG | HEIGHT: 74 IN | DIASTOLIC BLOOD PRESSURE: 92 MMHG | RESPIRATION RATE: 16 BRPM | BODY MASS INDEX: 36.57 KG/M2 | OXYGEN SATURATION: 97 %

## 2023-01-28 DIAGNOSIS — J02.9 ACUTE PHARYNGITIS, UNSPECIFIED ETIOLOGY: Primary | ICD-10-CM

## 2023-01-28 LAB
INFLUENZA A: NOT DETECTED
INFLUENZA B: NOT DETECTED
SARS-COV-2 RNA, RT PCR: NOT DETECTED
STREP GRP A PCR: NEGATIVE

## 2023-01-28 PROCEDURE — 96372 THER/PROPH/DIAG INJ SC/IM: CPT

## 2023-01-28 PROCEDURE — 6360000002 HC RX W HCPCS: Performed by: EMERGENCY MEDICINE

## 2023-01-28 PROCEDURE — 6370000000 HC RX 637 (ALT 250 FOR IP): Performed by: EMERGENCY MEDICINE

## 2023-01-28 PROCEDURE — 87636 SARSCOV2 & INF A&B AMP PRB: CPT

## 2023-01-28 PROCEDURE — 99284 EMERGENCY DEPT VISIT MOD MDM: CPT

## 2023-01-28 PROCEDURE — 87880 STREP A ASSAY W/OPTIC: CPT

## 2023-01-28 RX ORDER — DEXAMETHASONE SODIUM PHOSPHATE 4 MG/ML
4 INJECTION, SOLUTION INTRA-ARTICULAR; INTRALESIONAL; INTRAMUSCULAR; INTRAVENOUS; SOFT TISSUE ONCE
Status: COMPLETED | OUTPATIENT
Start: 2023-01-28 | End: 2023-01-28

## 2023-01-28 RX ORDER — MAGNESIUM HYDROXIDE/ALUMINUM HYDROXICE/SIMETHICONE 120; 1200; 1200 MG/30ML; MG/30ML; MG/30ML
SUSPENSION ORAL
Status: DISCONTINUED
Start: 2023-01-28 | End: 2023-01-28 | Stop reason: HOSPADM

## 2023-01-28 RX ORDER — IBUPROFEN 600 MG/1
600 TABLET ORAL 3 TIMES DAILY PRN
Qty: 30 TABLET | Refills: 0 | Status: SHIPPED | OUTPATIENT
Start: 2023-01-28

## 2023-01-28 RX ORDER — KETOROLAC TROMETHAMINE 30 MG/ML
60 INJECTION, SOLUTION INTRAMUSCULAR; INTRAVENOUS ONCE
Status: COMPLETED | OUTPATIENT
Start: 2023-01-28 | End: 2023-01-28

## 2023-01-28 RX ORDER — IBUPROFEN 200 MG
200 TABLET ORAL EVERY 6 HOURS PRN
COMMUNITY
End: 2023-01-28

## 2023-01-28 RX ADMIN — ALUMINUM HYDROXIDE, MAGNESIUM HYDROXIDE, AND SIMETHICONE: 200; 200; 20 SUSPENSION ORAL at 09:51

## 2023-01-28 RX ADMIN — DEXAMETHASONE SODIUM PHOSPHATE 4 MG: 4 INJECTION, SOLUTION INTRAMUSCULAR; INTRAVENOUS at 10:50

## 2023-01-28 RX ADMIN — KETOROLAC TROMETHAMINE 60 MG: 30 INJECTION, SOLUTION INTRAMUSCULAR at 10:46

## 2023-01-28 ASSESSMENT — PAIN - FUNCTIONAL ASSESSMENT
PAIN_FUNCTIONAL_ASSESSMENT: PREVENTS OR INTERFERES SOME ACTIVE ACTIVITIES AND ADLS
PAIN_FUNCTIONAL_ASSESSMENT: PREVENTS OR INTERFERES SOME ACTIVE ACTIVITIES AND ADLS
PAIN_FUNCTIONAL_ASSESSMENT: 0-10

## 2023-01-28 ASSESSMENT — PAIN DESCRIPTION - PAIN TYPE: TYPE: ACUTE PAIN

## 2023-01-28 ASSESSMENT — PAIN DESCRIPTION - ONSET: ONSET: SUDDEN

## 2023-01-28 ASSESSMENT — PAIN SCALES - GENERAL
PAINLEVEL_OUTOF10: 10
PAINLEVEL_OUTOF10: 7

## 2023-01-28 ASSESSMENT — PAIN DESCRIPTION - DESCRIPTORS
DESCRIPTORS: THROBBING
DESCRIPTORS: SORE;ACHING

## 2023-01-28 ASSESSMENT — PAIN DESCRIPTION - FREQUENCY: FREQUENCY: CONTINUOUS

## 2023-01-28 ASSESSMENT — PAIN DESCRIPTION - LOCATION
LOCATION: THROAT
LOCATION: THROAT;EAR

## 2023-01-28 ASSESSMENT — PAIN DESCRIPTION - ORIENTATION
ORIENTATION: INNER
ORIENTATION: INNER

## 2023-01-28 NOTE — ED PROVIDER NOTES
HPI:  1/28/23,   Time: 9:39 AM SANTI Gardiner is a 27 y.o. male presenting to the ED for sore throat/ear ache/runny nose, beginning 3 days ago. The complaint has been persistent, mild in severity, and worsened by nothing. Brought in by private vehicle. No nausea/vomiting. No muscle aches. Positive headache. Nothing makes better. Sick contacts. Review of Systems:   Pertinent positives and negatives are stated within HPI, all other systems reviewed and are negative.          --------------------------------------------- PAST HISTORY ---------------------------------------------  Past Medical History:  has a past medical history of Asthma, Back problem, and COVID-19. Past Surgical History:  has a past surgical history that includes Knee arthroscopy (Right). Social History:  reports that he has never smoked. He has quit using smokeless tobacco.  His smokeless tobacco use included chew. He reports that he does not currently use alcohol. He reports that he does not use drugs. Family History: family history includes Diabetes in his father. The patients home medications have been reviewed. Allergies: Patient has no known allergies. ---------------------------------------------------PHYSICAL EXAM--------------------------------------    Constitutional/General: Alert and oriented x3, well appearing, non toxic in NAD  Head: Normocephalic and atraumatic  Eyes: PERRL, EOMI, conjunctive normal, sclera non icteric  Mouth: Erythematous with tonsillar enlargement  Neck: Supple, full ROM,   Respiratory: Lungs clear to auscultation bilaterally, no wheezes, rales, or rhonchi. Not in respiratory distress  Cardiovascular:  Regular rate. Regular rhythm. s. 2+ distal pulses  Chest: No chest wall tenderness  GI:  Abdomen Soft, Non tender, Non distended. .   Musculoskeletal: Moves all extremities x 4. Warm and well perfused,   Integument: skin warm and dry. No rashes.    Lymphatic: no lymphadenopathy noted  Neurologic: GCS 15, no focal deficits,   Psychiatric: Normal Affect      Medical Decision Making:    Pharyngitis symptoms. Strep flu and COVID all negative. Treat supportively outpatient follow-up.      -------------------------------------------------- RESULTS -------------------------------------------------  I have personally reviewed all laboratory and imaging results for this patient. Results are listed below. LABS:  Results for orders placed or performed during the hospital encounter of 01/28/23   COVID-19 & Influenza Combo    Specimen: Nasopharyngeal Swab   Result Value Ref Range    SARS-CoV-2 RNA, RT PCR NOT DETECTED NOT DETECTED    INFLUENZA A NOT DETECTED NOT DETECTED    INFLUENZA B NOT DETECTED NOT DETECTED   Strep Screen Group A Throat    Specimen: Throat   Result Value Ref Range    Strep Grp A PCR Negative Negative       RADIOLOGY:  Interpreted by Radiologist.  No orders to display       EKG: This EKG is signed and interpreted by the EP. Time:   Rate:   Rhythm:   Interpretation:   Comparison:       ------------------------- NURSING NOTES AND VITALS REVIEWED ---------------------------   The nursing notes within the ED encounter and vital signs as below have been reviewed by myself. BP (!) 162/92   Pulse 81   Temp 98.5 °F (36.9 °C) (Oral)   Resp 16   Ht 6' 2\" (1.88 m)   Wt 285 lb (129.3 kg)   SpO2 97%   BMI 36.59 kg/m²   Oxygen Saturation Interpretation: Normal    The patients available past medical records and past encounters were reviewed.         ------------------------------ ED COURSE/MEDICAL DECISION MAKING----------------------  Medications   aluminum & magnesium hydroxide-simethicone (MAALOX) 200-200-20 MG/5ML suspension (  Not Given 1/28/23 0964)   aluminum & magnesium hydroxide-simethicone (MAALOX) 30 mL, lidocaine viscous hcl (XYLOCAINE) 5 mL (GI COCKTAIL) ( Oral Given 1/28/23 0904)   dexamethasone (DECADRON) injection 4 mg (4 mg IntraMUSCular Given 1/28/23 1050)   ketorolac (TORADOL) injection 60 mg (60 mg IntraMUSCular Given 1/28/23 1046)         ED COURSE:             This patient's ED course included: a personal history and physicial examination    This patient has remained hemodynamically stable during their ED course. Counseling: The emergency provider has spoken with the patient and discussed todays results, in addition to providing specific details for the plan of care and counseling regarding the diagnosis and prognosis. Questions are answered at this time and they are agreeable with the plan.       --------------------------------- IMPRESSION AND DISPOSITION ---------------------------------    IMPRESSION  1. Acute pharyngitis, unspecified etiology        DISPOSITION  Disposition: Discharge to home  Patient condition is stable    NOTE: This report was transcribed using voice recognition software.  Every effort was made to ensure accuracy; however, inadvertent computerized transcription errors may be present        Harini Benavides MD  01/28/23 3236

## 2023-01-28 NOTE — ED NOTES
Pt tolerated decadron and toradol injections, and pt given discharge summary with education on acute pharyngitis.   Pt aware of prescriptions at assigned pharmacy and to follow up with his primary care physician     Kuldip Christian RN  01/28/23 9361

## 2023-02-08 ENCOUNTER — HOSPITAL ENCOUNTER (EMERGENCY)
Age: 33
Discharge: HOME | End: 2023-02-08
Attending: STUDENT IN AN ORGANIZED HEALTH CARE EDUCATION/TRAINING PROGRAM
Payer: COMMERCIAL

## 2023-02-08 VITALS
WEIGHT: 285 LBS | BODY MASS INDEX: 36.59 KG/M2 | SYSTOLIC BLOOD PRESSURE: 132 MMHG | OXYGEN SATURATION: 97 % | RESPIRATION RATE: 16 BRPM | DIASTOLIC BLOOD PRESSURE: 102 MMHG | HEART RATE: 91 BPM | TEMPERATURE: 97.5 F

## 2023-02-08 DIAGNOSIS — H66.002 ACUTE SUPPURATIVE OTITIS MEDIA OF LEFT EAR WITHOUT SPONTANEOUS RUPTURE OF TYMPANIC MEMBRANE, RECURRENCE NOT SPECIFIED: Primary | ICD-10-CM

## 2023-02-08 LAB — STREP GRP A PCR: NEGATIVE

## 2023-02-08 PROCEDURE — 87880 STREP A ASSAY W/OPTIC: CPT

## 2023-02-08 PROCEDURE — 99283 EMERGENCY DEPT VISIT LOW MDM: CPT

## 2023-02-08 PROCEDURE — 6370000000 HC RX 637 (ALT 250 FOR IP): Performed by: PHYSICIAN ASSISTANT

## 2023-02-08 RX ORDER — AMOXICILLIN AND CLAVULANATE POTASSIUM 875; 125 MG/1; MG/1
1 TABLET, FILM COATED ORAL ONCE
Status: COMPLETED | OUTPATIENT
Start: 2023-02-08 | End: 2023-02-08

## 2023-02-08 RX ORDER — AMOXICILLIN AND CLAVULANATE POTASSIUM 875; 125 MG/1; MG/1
1 TABLET, FILM COATED ORAL 2 TIMES DAILY
Qty: 20 TABLET | Refills: 0 | Status: SHIPPED | OUTPATIENT
Start: 2023-02-08 | End: 2023-02-18

## 2023-02-08 RX ADMIN — AMOXICILLIN AND CLAVULANATE POTASSIUM 1 TABLET: 875; 125 TABLET, FILM COATED ORAL at 20:50

## 2023-02-08 ASSESSMENT — LIFESTYLE VARIABLES
HOW MANY STANDARD DRINKS CONTAINING ALCOHOL DO YOU HAVE ON A TYPICAL DAY: PATIENT DOES NOT DRINK
HOW OFTEN DO YOU HAVE A DRINK CONTAINING ALCOHOL: NEVER

## 2023-02-09 ENCOUNTER — TELEPHONE (OUTPATIENT)
Dept: ENT CLINIC | Age: 33
End: 2023-02-09

## 2023-02-09 NOTE — ED PROVIDER NOTES
Independent [facility] Visit. [address] [facility]  Department of Emergency Medicine   ED  Encounter Note  Admit Date/RoomTime: 2023  8:07 PM  ED Room: rTish Domingo  NAME: Ray Landaverde  : 1990  MRN: [id number]     Chief Complaint:  Otalgia and Pharyngitis    HISTORY OF PRESENT ILLNESS        Ray Landaverde is a 27 y.o. male who presents to the ED with a complaint of left ear pain and hearing loss. Patient states he was here at the end of January for complaint of sore throat and ear pain. States strep test was negative. He was told to take just anti-inflammatories. Patient states at home however he he actually took one of his wife's old antibiotics for about 7 days. Also took an old prednisone pack that he had. He is also taking over-the-counter Claritin and Benadryl. He presents today because his left ear still bothering him. It feels full and clogged. He is having hearing loss in his left ear. Does admit his throat is still irritated, but not as bad as it was. He denies any fevers or chills. Denies any difficulty swallowing. Denies any cough or chest congestion. Symptoms are moderate in severity with main complaint being the fullness and hearing loss to his left ear. ROS   Pertinent positives and negatives are stated within HPI, all other systems reviewed and are negative. Past Medical History:  has a past medical history of Asthma, Back problem, and COVID-19. Surgical History:  has a past surgical history that includes Knee arthroscopy (Right). Social History:  reports that he has never smoked. He has quit using smokeless tobacco.  His smokeless tobacco use included chew. He reports that he does not currently use alcohol. He reports that he does not use drugs. Family History: family history includes Diabetes in his father. Allergies: Patient has no known allergies. PHYSICAL EXAM   Oxygen Saturation Interpretation: Normal on room air analysis.         ED Triage Vitals   BP Temp Temp Source Heart Rate Resp SpO2 Height Weight   02/08/23 1916 02/08/23 1916 02/08/23 1916 02/08/23 1916 02/08/23 1916 02/08/23 1916 -- 02/08/23 1951   (!) 132/102 97.5 °F (36.4 °C) Oral 91 16 97 %  285 lb (129.3 kg)         General:  NAD. Alert and Oriented. Well-appearing. Skin:  Warm, dry. No rashes. Head:  Normocephalic. Atraumatic. Eyes:  EOMI. Conjunctiva normal.  ENT:  Oral mucosa moist.  Airway patent. Posterior pharynx with mild erythema. He does have tonsils with crevices. Uvula is midline. Negative exudate. Right TM is without erythema, without bulging. Ear canal is patent. Left TM is opaque and bulging. I also appreciate a red streak through it. Ear canal is patent. Neck:  Supple. Normal ROM. Respiratory:  No respiratory distress. No labored breathing. Lungs clear without rales, rhonchi or wheezing. Cardiovascular:  Regular rate. No Murmur. No peripheral edema. Extremities warm and good color. Extremities:  Normal ROM. Nontender to palpation. Atraumatic. Back:  Normal ROM. Nontender to palpation. Neuro:  Alert and Oriented to person, place, time and situation. Normal LOC. Moves all extremities. Speech fluent. Psych:  Calm and Cooperative. Normal thought process. Normal judgement. Lab / Imaging Results   (All laboratory and radiology results have been personally reviewed by myself)  Labs:  Results for orders placed or performed during the hospital encounter of 02/08/23   Strep Screen Group A Throat    Specimen: Throat   Result Value Ref Range    Strep Grp A PCR Negative Negative     Imaging: All Radiology results interpreted by Radiologist unless otherwise noted.   No orders to display       ED Course / Medical Decision Making     Medications   amoxicillin-clavulanate (AUGMENTIN) 875-125 MG per tablet 1 tablet (1 tablet Oral Given 2/8/23 2050)        Re-examination:  2/8/23       Time:   Patients condition .    Consult(s):   None    Procedure(s):   None    MDM:     47-year-old male presents with left ear fullness and hearing loss. Recent sore throat. Strep test negative. Patient will be treated with oral antibiotics and decongestant. Follow-up with family doctor. Plan of Care/Counseling:  Fara Snell reviewed today's visit with the patient in addition to providing specific details for the plan of care and counseling regarding the diagnosis and prognosis. Questions are answered at this time and are agreeable with the plan. ASSESSMENT     1. Acute suppurative otitis media of left ear without spontaneous rupture of tympanic membrane, recurrence not specified      PLAN   Discharged home. Patient condition is good    New Medications     New Prescriptions    AMOXICILLIN-CLAVULANATE (AUGMENTIN) 875-125 MG PER TABLET    Take 1 tablet by mouth 2 times daily for 10 days    LORATADINE-PSEUDOEPHEDRINE (CLARITIN-D 12HR) 5-120 MG PER EXTENDED RELEASE TABLET    Take 1 tablet by mouth every 12 hours for 5 days     Electronically signed by SIMEON Snell   DD: 2/8/23  **This report was transcribed using voice recognition software. Every effort was made to ensure accuracy; however, inadvertent computerized transcription errors may be present.   END OF ED PROVIDER NOTE       Fara Snell  02/08/23 4844

## 2023-02-09 NOTE — TELEPHONE ENCOUNTER
Pt needs a ER follow up [facility], Acute suppurative otitis media of left ear without spontaneous rupture of tympanic membrane, recurrence not specified. Please contact pt.

## 2023-02-20 ENCOUNTER — PROCEDURE VISIT (OUTPATIENT)
Dept: AUDIOLOGY | Age: 33
End: 2023-02-20
Payer: COMMERCIAL

## 2023-02-20 ENCOUNTER — OFFICE VISIT (OUTPATIENT)
Dept: ENT CLINIC | Age: 33
End: 2023-02-20
Payer: COMMERCIAL

## 2023-02-20 VITALS — BODY MASS INDEX: 36.57 KG/M2 | HEIGHT: 74 IN | WEIGHT: 285 LBS

## 2023-02-20 DIAGNOSIS — H90.11 CONDUCTIVE HEARING LOSS OF RIGHT EAR, UNSPECIFIED HEARING STATUS ON CONTRALATERAL SIDE: Primary | ICD-10-CM

## 2023-02-20 DIAGNOSIS — H69.82 ACUTE DYSFUNCTION OF LEFT EUSTACHIAN TUBE: ICD-10-CM

## 2023-02-20 DIAGNOSIS — H90.A31 MIXED CONDUCTIVE AND SENSORINEURAL HEARING LOSS OF RIGHT EAR WITH RESTRICTED HEARING OF LEFT EAR: Primary | ICD-10-CM

## 2023-02-20 PROCEDURE — 92567 TYMPANOMETRY: CPT | Performed by: AUDIOLOGIST

## 2023-02-20 PROCEDURE — G8427 DOCREV CUR MEDS BY ELIG CLIN: HCPCS | Performed by: OTOLARYNGOLOGY

## 2023-02-20 PROCEDURE — 92557 COMPREHENSIVE HEARING TEST: CPT | Performed by: AUDIOLOGIST

## 2023-02-20 PROCEDURE — G8484 FLU IMMUNIZE NO ADMIN: HCPCS | Performed by: OTOLARYNGOLOGY

## 2023-02-20 PROCEDURE — 99214 OFFICE O/P EST MOD 30 MIN: CPT | Performed by: OTOLARYNGOLOGY

## 2023-02-20 PROCEDURE — 1036F TOBACCO NON-USER: CPT | Performed by: OTOLARYNGOLOGY

## 2023-02-20 PROCEDURE — G8417 CALC BMI ABV UP PARAM F/U: HCPCS | Performed by: OTOLARYNGOLOGY

## 2023-02-20 RX ORDER — METHYLPREDNISOLONE 4 MG/1
4 TABLET ORAL SEE ADMIN INSTRUCTIONS
Qty: 1 KIT | Refills: 0 | Status: SHIPPED | OUTPATIENT
Start: 2023-02-20 | End: 2023-02-26

## 2023-02-20 RX ORDER — LIDOCAINE HYDROCHLORIDE 20 MG/ML
SOLUTION OROPHARYNGEAL
COMMUNITY
Start: 2023-01-28

## 2023-02-20 RX ORDER — FLUTICASONE PROPIONATE 50 MCG
2 SPRAY, SUSPENSION (ML) NASAL DAILY
Qty: 1 EACH | Refills: 2 | Status: SHIPPED | OUTPATIENT
Start: 2023-02-20

## 2023-02-20 ASSESSMENT — ENCOUNTER SYMPTOMS
RHINORRHEA: 0
SINUS PRESSURE: 0
SINUS PAIN: 0

## 2023-02-20 NOTE — PROGRESS NOTES
[facility] Otolaryngology  Dr. Arcelia Gross. NELSON Zacarias Ms.Ed. New Consult       Patient Name:  Adeola Mora  :  1990     CHIEF C/O:    Chief Complaint   Patient presents with    New Patient     NP Hx 2015 asym. HL RT side. Lt clogged. HISTORY OBTAINED FROM:  patient    HISTORY OF PRESENT ILLNESS:       Raegan Gonzalez is a 26y.o. year old male, here today for hearing loss. Saw Dr. Sepideh Banuelos ~8 years ago and had MRI at that time that was normal . Hasn't followed up regarding this. Still having R sided hearing loss and now also feels like L side is \"clogged\" for 1 month after having an ear infection on that side. Seems like the L side is getting better, but still feels like he is in a tunnel. Has been on abx (augmentin 23), steroid ear drops, claritin, tried prednisone. Hasn't tried flonase, but did use afrin while sick. 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 Past Medical History:   Diagnosis Date    Asthma     Back problem     COVID-19      Past Surgical History:   Procedure Laterality Date    KNEE ARTHROSCOPY Right     ACL        Current Outpatient Medications:     lidocaine viscous hcl (XYLOCAINE) 2 % SOLN solution, Swish and spit 5 milliliters 4 times a day as needed for irritation, Disp: , Rfl:     ibuprofen (ADVIL;MOTRIN) 600 MG tablet, Take 1 tablet by mouth 3 times daily as needed for Pain, Disp: 30 tablet, Rfl: 0    Magic Mouthwash (MIRACLE MOUTHWASH), Swish and spit 5 mLs 4 times daily as needed for Irritation (Patient not taking: No sig reported), Disp: 240 mL, Rfl: 0    cloNIDine (CATAPRES) 0.1 MG tablet, Take 1 tablet by mouth 4 times daily as needed for High Blood Pressure (opioid withdrawal), Disp: 60 tablet, Rfl: 0  Patient has no known allergies.   Social History     Tobacco Use    Smoking status: Never    Smokeless tobacco: Former     Types: Chew   Vaping Use    Vaping Use: Every day    Substances: Nicotine    Devices: Juul   Substance Use Topics    Alcohol use: Not Currently     Alcohol/week: 0.0 standard drinks     Comment: SOCIALLY    Drug use: No     Family History   Problem Relation Age of Onset    Diabetes Father        Review of Systems   Constitutional:  Negative for chills and fever. HENT:  Positive for hearing loss. Negative for congestion, ear discharge, ear pain, rhinorrhea, sinus pressure, sinus pain and tinnitus. Respiratory:  Negative for cough and shortness of breath. Cardiovascular:  Negative for chest pain and palpitations. Gastrointestinal:  Negative for vomiting. Skin:  Negative for rash. Allergic/Immunologic: Negative for environmental allergies. Neurological:  Negative for dizziness and headaches. Hematological:  Does not bruise/bleed easily. All other systems reviewed and are negative. Ht 6' 2\" (1.88 m)   Wt 285 lb (129.3 kg)   BMI 36.59 kg/m²   Physical Exam  Vitals and nursing note reviewed. Constitutional:       Appearance: Normal appearance. He is well-developed. HENT:      Head: Normocephalic and atraumatic. Right Ear: Tympanic membrane and ear canal normal.      Left Ear: Tympanic membrane and ear canal normal.      Ears:      Comments: Serous Effusion L ear without erythema. R TM wnl. Nose: No congestion or rhinorrhea. Eyes:      Pupils: Pupils are equal, round, and reactive to light. Neck:      Thyroid: No thyromegaly. Trachea: No tracheal deviation. Cardiovascular:      Rate and Rhythm: Normal rate. Pulmonary:      Effort: Pulmonary effort is normal. No respiratory distress. Musculoskeletal:         General: Normal range of motion. Cervical back: Normal range of motion. Lymphadenopathy:      Cervical: No cervical adenopathy. Skin:     General: Skin is warm. Findings: No erythema. Neurological:      Mental Status: He is alert. Cranial Nerves: No cranial nerve deficit.        IMPRESSION/PLAN:  R sided conductive hearing loss  L sided eustachian tube dysfunction  Start flonase 2 sprays bilaterally daily  Medrol dose armida  Follow up 1 month in office with repeat tympanogram  Refer back to audiology for possible hearing aid for R ear    Dr. Prakash Cardenas. [facility] Otol[facility]/Facial Plastic Surgery Residency  Associate Clinical Professor:  Melva Rashid, [facility]

## 2023-02-20 NOTE — PROGRESS NOTES
This patient was referred for audiometric/tympanometric testing by Dr. Gabriella Calhoun due to hearing loss. Audiometry using pure tone air and bone conduction revealed a mild sensorineural hearing loss through 6000 Hz sloping to a moderate loss at 8000 Hz in the left ear. Right ear revealed a moderate to severe mixed hearing loss. Asymmetric hearing loss noted : right worse. Reliability was good. Speech reception thresholds were in good agreement with the pure tone averages, bilaterally. Speech discrimination scores were excellent, bilaterally. Tympanometry revealed negative middle ear pressure (-253 daPa), in the left ear. Peak pressure and compliance within normal limits in the right ear. Ipsilateral acoustic reflexes at 1000 Hz were present bilaterally. .     The results were reviewed with the patient and physician. The patient will consider the purchase of amplification and return to this department, should they decide to pursue a hearing aid fitting. Contact information was given to the patient.       Claus Leo/[facility]  [place] Lic # [id number]

## 2023-02-22 ENCOUNTER — TELEPHONE (OUTPATIENT)
Dept: AUDIOLOGY | Age: 33
End: 2023-02-22

## 2023-03-06 ASSESSMENT — ENCOUNTER SYMPTOMS
COUGH: 0
VOMITING: 0
SHORTNESS OF BREATH: 0

## 2023-04-03 ENCOUNTER — PROCEDURE VISIT (OUTPATIENT)
Dept: AUDIOLOGY | Age: 33
End: 2023-04-03
Payer: COMMERCIAL

## 2023-04-03 ENCOUNTER — OFFICE VISIT (OUTPATIENT)
Dept: ENT CLINIC | Age: 33
End: 2023-04-03

## 2023-04-03 VITALS
BODY MASS INDEX: 36.57 KG/M2 | HEIGHT: 74 IN | SYSTOLIC BLOOD PRESSURE: 125 MMHG | WEIGHT: 285 LBS | HEART RATE: 72 BPM | DIASTOLIC BLOOD PRESSURE: 85 MMHG

## 2023-04-03 DIAGNOSIS — H90.A31 MIXED CONDUCTIVE AND SENSORINEURAL HEARING LOSS OF RIGHT EAR WITH RESTRICTED HEARING OF LEFT EAR: ICD-10-CM

## 2023-04-03 DIAGNOSIS — H69.82 ACUTE DYSFUNCTION OF LEFT EUSTACHIAN TUBE: ICD-10-CM

## 2023-04-03 DIAGNOSIS — H65.92 MIDDLE EAR EFFUSION, LEFT: Primary | ICD-10-CM

## 2023-04-03 DIAGNOSIS — H65.92 OME (OTITIS MEDIA WITH EFFUSION), LEFT: ICD-10-CM

## 2023-04-03 DIAGNOSIS — H90.11 CONDUCTIVE HEARING LOSS OF RIGHT EAR, UNSPECIFIED HEARING STATUS ON CONTRALATERAL SIDE: ICD-10-CM

## 2023-04-03 DIAGNOSIS — H69.82 ETD (EUSTACHIAN TUBE DYSFUNCTION), LEFT: Primary | ICD-10-CM

## 2023-04-03 PROCEDURE — 92567 TYMPANOMETRY: CPT | Performed by: AUDIOLOGIST

## 2023-04-03 RX ORDER — CIPROFLOXACIN HYDROCHLORIDE 3.5 MG/ML
SOLUTION/ DROPS TOPICAL
Qty: 1 EACH | Refills: 1 | Status: SHIPPED | OUTPATIENT
Start: 2023-04-03

## 2023-04-03 ASSESSMENT — ENCOUNTER SYMPTOMS
COUGH: 0
SINUS PRESSURE: 0
RHINORRHEA: 0
SHORTNESS OF BREATH: 0
VOMITING: 0
SINUS PAIN: 0

## 2023-04-03 NOTE — PROGRESS NOTES
This patient was referred for audiometric/tympanometric testing by Dr. Gale Thayer due to left ETD. Tympanometry revealed normal middle ear peak pressure and compliance, in the right ear and flat in the left ear. Ipsilateral acoustic reflexes at 1000 Hz were present bilaterally. The results were reviewed with the patient and physician. The benefits and limitations of amplification were discussed with the patient. It is recommended that the patient be fit with binaural hearing aids. If applicable, the patients test results/report will be sent to the insurance provider for Prior Authorization.     iPhone  Color silver  Wants rechargable  2 length ( with Oticon measurments)       Claus Romero/[facility]  [place] Lic # [id number]

## 2023-04-17 ENCOUNTER — OFFICE VISIT (OUTPATIENT)
Dept: ENT CLINIC | Age: 33
End: 2023-04-17
Payer: COMMERCIAL

## 2023-04-17 ENCOUNTER — PROCEDURE VISIT (OUTPATIENT)
Dept: AUDIOLOGY | Age: 33
End: 2023-04-17
Payer: COMMERCIAL

## 2023-04-17 VITALS — HEIGHT: 74 IN | WEIGHT: 280 LBS | BODY MASS INDEX: 35.94 KG/M2

## 2023-04-17 DIAGNOSIS — H69.82 EUSTACHIAN TUBE DYSFUNCTION, LEFT: Primary | ICD-10-CM

## 2023-04-17 DIAGNOSIS — H69.82 ETD (EUSTACHIAN TUBE DYSFUNCTION), LEFT: Primary | ICD-10-CM

## 2023-04-17 PROCEDURE — 99213 OFFICE O/P EST LOW 20 MIN: CPT | Performed by: OTOLARYNGOLOGY

## 2023-04-17 PROCEDURE — 1036F TOBACCO NON-USER: CPT | Performed by: OTOLARYNGOLOGY

## 2023-04-17 PROCEDURE — 92567 TYMPANOMETRY: CPT | Performed by: AUDIOLOGIST

## 2023-04-17 PROCEDURE — G8417 CALC BMI ABV UP PARAM F/U: HCPCS | Performed by: OTOLARYNGOLOGY

## 2023-04-17 PROCEDURE — G8427 DOCREV CUR MEDS BY ELIG CLIN: HCPCS | Performed by: OTOLARYNGOLOGY

## 2023-04-17 RX ORDER — AZELASTINE 1 MG/ML
2 SPRAY, METERED NASAL DAILY
Qty: 30 ML | Refills: 3 | Status: SHIPPED | OUTPATIENT
Start: 2023-04-17

## 2023-04-17 ASSESSMENT — ENCOUNTER SYMPTOMS
COUGH: 0
VOMITING: 0
RHINORRHEA: 0
SINUS PAIN: 0
SINUS PRESSURE: 0
SHORTNESS OF BREATH: 0

## 2023-04-18 NOTE — PROGRESS NOTES
This patient was referred for tympanometric testing by Dr. Socorro Jose due to left eustachian tube dysfunction. Tympanometry revealed normal middle ear peak pressure and compliance, in the right ear and negative middle ear pressure (-220 daPa), in the left ear. The results were reviewed with the patient. Recommendations for follow up will be made pending physician consult.     Claus Bay [facility]  [address] [facility] [id number]  Electronically signed by Claus Bay on 4/18/2023 at 1:21 PM

## 2023-06-20 ENCOUNTER — PROCEDURE VISIT (OUTPATIENT)
Dept: AUDIOLOGY | Age: 33
End: 2023-06-20

## 2023-06-20 DIAGNOSIS — H90.A31 MIXED CONDUCTIVE AND SENSORINEURAL HEARING LOSS OF RIGHT EAR WITH RESTRICTED HEARING OF LEFT EAR: Primary | ICD-10-CM

## 2023-06-20 PROCEDURE — 99024 POSTOP FOLLOW-UP VISIT: CPT | Performed by: AUDIOLOGIST

## 2023-06-20 NOTE — PROGRESS NOTES
Fit with binaural  [employer]  hearing aids. Instructed in use and care. Gave  battery charger, warranty information and scheduled  30 day check for 7/17. Made following adjustments: no adjustments made. Hearing aid contract/battery warning form reviewed and signed. Patient was satisfied and will follow up on above date, unless problems arise. No charge visit today. Will bill at 30 day follow up per [facility] guidelines.        Claus Spence [facility]  [address] [facility].[id number]   Electronically signed by Claus Spence on 6/20/2023 at 1:41 PM

## 2023-08-22 ENCOUNTER — HOSPITAL ENCOUNTER (EMERGENCY)
Age: 33
Discharge: HOME | End: 2023-08-22
Attending: EMERGENCY MEDICINE
Payer: COMMERCIAL

## 2023-08-22 ENCOUNTER — APPOINTMENT (OUTPATIENT)
Dept: GENERAL RADIOLOGY | Age: 33
End: 2023-08-22
Payer: COMMERCIAL

## 2023-08-22 VITALS
OXYGEN SATURATION: 99 % | HEART RATE: 72 BPM | RESPIRATION RATE: 16 BRPM | BODY MASS INDEX: 36.57 KG/M2 | HEIGHT: 74 IN | DIASTOLIC BLOOD PRESSURE: 82 MMHG | WEIGHT: 285 LBS | TEMPERATURE: 97.9 F | SYSTOLIC BLOOD PRESSURE: 126 MMHG

## 2023-08-22 DIAGNOSIS — R07.9 CHEST PAIN, UNSPECIFIED TYPE: Primary | ICD-10-CM

## 2023-08-22 DIAGNOSIS — M62.82 NON-TRAUMATIC RHABDOMYOLYSIS: ICD-10-CM

## 2023-08-22 DIAGNOSIS — R74.8 ABNORMAL CPK: ICD-10-CM

## 2023-08-22 LAB
ANION GAP SERPL CALCULATED.3IONS-SCNC: 10 MMOL/L (ref 7–16)
BASOPHILS # BLD: 0.03 K/UL (ref 0–0.2)
BASOPHILS NFR BLD: 1 % (ref 0–2)
BNP SERPL-MCNC: <26 PG/ML (ref 0–125)
BUN SERPL-MCNC: 12 MG/DL (ref 6–20)
CALCIUM SERPL-MCNC: 9 MG/DL (ref 8.6–10.2)
CHLORIDE SERPL-SCNC: 103 MMOL/L (ref 98–107)
CK SERPL-CCNC: 459 U/L (ref 20–200)
CO2 SERPL-SCNC: 26 MMOL/L (ref 22–29)
CREAT SERPL-MCNC: 0.8 MG/DL (ref 0.7–1.2)
D DIMER: <200 NG/ML DDU (ref 0–232)
EKG ATRIAL RATE: 59 BPM
EKG P AXIS: 29 DEGREES
EKG P-R INTERVAL: 188 MS
EKG Q-T INTERVAL: 410 MS
EKG QRS DURATION: 110 MS
EKG QTC CALCULATION (BAZETT): 405 MS
EKG R AXIS: 6 DEGREES
EKG T AXIS: 21 DEGREES
EKG VENTRICULAR RATE: 59 BPM
EOSINOPHIL # BLD: 0.1 K/UL (ref 0.05–0.5)
EOSINOPHILS RELATIVE PERCENT: 2 % (ref 0–6)
ERYTHROCYTE [DISTWIDTH] IN BLOOD BY AUTOMATED COUNT: 12.2 % (ref 11.5–15)
FLUAV RNA RESP QL NAA+PROBE: NOT DETECTED
FLUBV RNA RESP QL NAA+PROBE: NOT DETECTED
GFR SERPL CREATININE-BSD FRML MDRD: >60 ML/MIN/1.73M2
GLUCOSE SERPL-MCNC: 114 MG/DL (ref 74–99)
HCT VFR BLD AUTO: 45.5 % (ref 37–54)
HGB BLD-MCNC: 15.4 G/DL (ref 12.5–16.5)
IMM GRANULOCYTES # BLD AUTO: 0.04 K/UL (ref 0–0.58)
IMM GRANULOCYTES NFR BLD: 1 % (ref 0–5)
LACTATE BLDV-SCNC: 1.1 MMOL/L (ref 0.5–2.2)
LYMPHOCYTES NFR BLD: 1.66 K/UL (ref 1.5–4)
LYMPHOCYTES RELATIVE PERCENT: 28 % (ref 20–42)
MCH RBC QN AUTO: 29.1 PG (ref 26–35)
MCHC RBC AUTO-ENTMCNC: 33.8 G/DL (ref 32–34.5)
MCV RBC AUTO: 85.8 FL (ref 80–99.9)
MONOCYTES NFR BLD: 0.66 K/UL (ref 0.1–0.95)
MONOCYTES NFR BLD: 11 % (ref 2–12)
NEUTROPHILS NFR BLD: 58 % (ref 43–80)
NEUTS SEG NFR BLD: 3.44 K/UL (ref 1.8–7.3)
PLATELET # BLD AUTO: 204 K/UL (ref 130–450)
PMV BLD AUTO: 9.2 FL (ref 7–12)
POTASSIUM SERPL-SCNC: 4.1 MMOL/L (ref 3.5–5)
RBC # BLD AUTO: 5.3 M/UL (ref 3.8–5.8)
SARS-COV-2 RNA RESP QL NAA+PROBE: NOT DETECTED
SODIUM SERPL-SCNC: 139 MMOL/L (ref 132–146)
SOURCE: NORMAL
SPECIMEN DESCRIPTION: NORMAL
TROPONIN I SERPL HS-MCNC: <6 NG/L (ref 0–11)
TROPONIN I SERPL HS-MCNC: <6 NG/L (ref 0–11)
WBC OTHER # BLD: 5.9 K/UL (ref 4.5–11.5)

## 2023-08-22 PROCEDURE — 82550 ASSAY OF CK (CPK): CPT

## 2023-08-22 PROCEDURE — 80048 BASIC METABOLIC PNL TOTAL CA: CPT

## 2023-08-22 PROCEDURE — 93010 ELECTROCARDIOGRAM REPORT: CPT | Performed by: INTERNAL MEDICINE

## 2023-08-22 PROCEDURE — 6360000002 HC RX W HCPCS: Performed by: EMERGENCY MEDICINE

## 2023-08-22 PROCEDURE — 87636 SARSCOV2 & INF A&B AMP PRB: CPT

## 2023-08-22 PROCEDURE — 85025 COMPLETE CBC W/AUTO DIFF WBC: CPT

## 2023-08-22 PROCEDURE — 93005 ELECTROCARDIOGRAM TRACING: CPT | Performed by: EMERGENCY MEDICINE

## 2023-08-22 PROCEDURE — 96374 THER/PROPH/DIAG INJ IV PUSH: CPT

## 2023-08-22 PROCEDURE — 71045 X-RAY EXAM CHEST 1 VIEW: CPT

## 2023-08-22 PROCEDURE — 2580000003 HC RX 258: Performed by: EMERGENCY MEDICINE

## 2023-08-22 PROCEDURE — 83605 ASSAY OF LACTIC ACID: CPT

## 2023-08-22 PROCEDURE — 83880 ASSAY OF NATRIURETIC PEPTIDE: CPT

## 2023-08-22 PROCEDURE — 84484 ASSAY OF TROPONIN QUANT: CPT

## 2023-08-22 PROCEDURE — 99285 EMERGENCY DEPT VISIT HI MDM: CPT

## 2023-08-22 PROCEDURE — 85379 FIBRIN DEGRADATION QUANT: CPT

## 2023-08-22 RX ORDER — 0.9 % SODIUM CHLORIDE 0.9 %
1000 INTRAVENOUS SOLUTION INTRAVENOUS ONCE
Status: COMPLETED | OUTPATIENT
Start: 2023-08-22 | End: 2023-08-22

## 2023-08-22 RX ORDER — KETOROLAC TROMETHAMINE 30 MG/ML
15 INJECTION, SOLUTION INTRAMUSCULAR; INTRAVENOUS ONCE
Status: COMPLETED | OUTPATIENT
Start: 2023-08-22 | End: 2023-08-22

## 2023-08-22 RX ADMIN — SODIUM CHLORIDE 1000 ML: 9 INJECTION, SOLUTION INTRAVENOUS at 12:08

## 2023-08-22 RX ADMIN — KETOROLAC TROMETHAMINE 15 MG: 30 INJECTION, SOLUTION INTRAMUSCULAR; INTRAVENOUS at 12:08

## 2023-08-22 ASSESSMENT — PAIN - FUNCTIONAL ASSESSMENT: PAIN_FUNCTIONAL_ASSESSMENT: 0-10

## 2023-08-22 ASSESSMENT — PAIN SCALES - GENERAL: PAINLEVEL_OUTOF10: 7

## 2023-09-11 ENCOUNTER — TELEPHONE (OUTPATIENT)
Dept: AUDIOLOGY | Age: 33
End: 2023-09-11

## 2023-09-14 ENCOUNTER — TELEPHONE (OUTPATIENT)
Dept: AUDIOLOGY | Age: 33
End: 2023-09-14

## 2024-08-19 ENCOUNTER — APPOINTMENT (OUTPATIENT)
Dept: GENERAL RADIOLOGY | Age: 34
End: 2024-08-19
Payer: COMMERCIAL

## 2024-08-19 ENCOUNTER — HOSPITAL ENCOUNTER (EMERGENCY)
Age: 34
Discharge: HOME | End: 2024-08-19
Attending: EMERGENCY MEDICINE
Payer: COMMERCIAL

## 2024-08-19 VITALS
SYSTOLIC BLOOD PRESSURE: 126 MMHG | DIASTOLIC BLOOD PRESSURE: 65 MMHG | OXYGEN SATURATION: 94 % | RESPIRATION RATE: 16 BRPM | TEMPERATURE: 98.1 F | HEART RATE: 70 BPM

## 2024-08-19 DIAGNOSIS — Z77.098 EXPOSURE TO CHEMICAL INHALATION: ICD-10-CM

## 2024-08-19 DIAGNOSIS — R07.9 CHEST PAIN, UNSPECIFIED TYPE: Primary | ICD-10-CM

## 2024-08-19 DIAGNOSIS — R06.02 SHORTNESS OF BREATH: ICD-10-CM

## 2024-08-19 LAB
ALBUMIN SERPL-MCNC: 4.1 G/DL (ref 3.5–5.2)
ALP SERPL-CCNC: 71 U/L (ref 40–129)
ALT SERPL-CCNC: 29 U/L (ref 0–40)
AMYLASE SERPL-CCNC: 46 U/L (ref 20–100)
ANION GAP SERPL CALCULATED.3IONS-SCNC: 13 MMOL/L (ref 7–16)
AST SERPL-CCNC: 23 U/L (ref 0–39)
BILIRUB DIRECT SERPL-MCNC: <0.2 MG/DL (ref 0–0.3)
BILIRUB INDIRECT SERPL-MCNC: NORMAL MG/DL (ref 0–1)
BILIRUB SERPL-MCNC: 0.4 MG/DL (ref 0–1.2)
BNP SERPL-MCNC: <26 PG/ML (ref 0–125)
BUN SERPL-MCNC: 12 MG/DL (ref 6–20)
CALCIUM SERPL-MCNC: 8.6 MG/DL (ref 8.6–10.2)
CHLORIDE SERPL-SCNC: 105 MMOL/L (ref 98–107)
CK SERPL-CCNC: 512 U/L (ref 20–200)
CO2 SERPL-SCNC: 22 MMOL/L (ref 22–29)
CREAT SERPL-MCNC: 0.7 MG/DL (ref 0.7–1.2)
D-DIMER QUANTITATIVE: <200 NG/ML DDU (ref 0–230)
EKG ATRIAL RATE: 67 BPM
EKG P AXIS: 59 DEGREES
EKG P-R INTERVAL: 200 MS
EKG Q-T INTERVAL: 412 MS
EKG QRS DURATION: 114 MS
EKG QTC CALCULATION (BAZETT): 435 MS
EKG R AXIS: 15 DEGREES
EKG T AXIS: 31 DEGREES
EKG VENTRICULAR RATE: 67 BPM
ERYTHROCYTE [DISTWIDTH] IN BLOOD BY AUTOMATED COUNT: 12 % (ref 11.5–15)
GFR, ESTIMATED: >90 ML/MIN/1.73M2
GLUCOSE SERPL-MCNC: 93 MG/DL (ref 74–99)
HCT VFR BLD AUTO: 40.6 % (ref 37–54)
HGB BLD-MCNC: 14.2 G/DL (ref 12.5–16.5)
INR PPP: 1.2
LIPASE SERPL-CCNC: 17 U/L (ref 13–60)
MAGNESIUM SERPL-MCNC: 1.9 MG/DL (ref 1.6–2.6)
MCH RBC QN AUTO: 29.3 PG (ref 26–35)
MCHC RBC AUTO-ENTMCNC: 35 G/DL (ref 32–34.5)
MCV RBC AUTO: 83.7 FL (ref 80–99.9)
PLATELET # BLD AUTO: 228 K/UL (ref 130–450)
PMV BLD AUTO: 8.7 FL (ref 7–12)
POTASSIUM SERPL-SCNC: 3.4 MMOL/L (ref 3.5–5)
PROT SERPL-MCNC: 6.5 G/DL (ref 6.4–8.3)
PROTHROMBIN TIME: 12.9 SEC (ref 9.3–12.4)
RBC # BLD AUTO: 4.85 M/UL (ref 3.8–5.8)
SODIUM SERPL-SCNC: 140 MMOL/L (ref 132–146)
TROPONIN I SERPL HS-MCNC: 7 NG/L (ref 0–11)
WBC OTHER # BLD: 4.4 K/UL (ref 4.5–11.5)

## 2024-08-19 PROCEDURE — 80053 COMPREHEN METABOLIC PANEL: CPT

## 2024-08-19 PROCEDURE — 83690 ASSAY OF LIPASE: CPT

## 2024-08-19 PROCEDURE — 82150 ASSAY OF AMYLASE: CPT

## 2024-08-19 PROCEDURE — 85610 PROTHROMBIN TIME: CPT

## 2024-08-19 PROCEDURE — 6370000000 HC RX 637 (ALT 250 FOR IP): Performed by: EMERGENCY MEDICINE

## 2024-08-19 PROCEDURE — 85379 FIBRIN DEGRADATION QUANT: CPT

## 2024-08-19 PROCEDURE — 93010 ELECTROCARDIOGRAM REPORT: CPT | Performed by: INTERNAL MEDICINE

## 2024-08-19 PROCEDURE — 85027 COMPLETE CBC AUTOMATED: CPT

## 2024-08-19 PROCEDURE — 71045 X-RAY EXAM CHEST 1 VIEW: CPT

## 2024-08-19 PROCEDURE — 83735 ASSAY OF MAGNESIUM: CPT

## 2024-08-19 PROCEDURE — 93005 ELECTROCARDIOGRAM TRACING: CPT | Performed by: EMERGENCY MEDICINE

## 2024-08-19 PROCEDURE — 84484 ASSAY OF TROPONIN QUANT: CPT

## 2024-08-19 PROCEDURE — 82550 ASSAY OF CK (CPK): CPT

## 2024-08-19 PROCEDURE — 82248 BILIRUBIN DIRECT: CPT

## 2024-08-19 PROCEDURE — 83880 ASSAY OF NATRIURETIC PEPTIDE: CPT

## 2024-08-19 PROCEDURE — 99285 EMERGENCY DEPT VISIT HI MDM: CPT

## 2024-08-19 RX ORDER — POTASSIUM CHLORIDE 20 MEQ/1
40 TABLET, EXTENDED RELEASE ORAL ONCE
Status: COMPLETED | OUTPATIENT
Start: 2024-08-19 | End: 2024-08-19

## 2024-08-19 RX ADMIN — POTASSIUM CHLORIDE 40 MEQ: 1500 TABLET, EXTENDED RELEASE ORAL at 03:39

## 2024-08-19 ASSESSMENT — LIFESTYLE VARIABLES
HOW OFTEN DO YOU HAVE A DRINK CONTAINING ALCOHOL: MONTHLY OR LESS
HOW MANY STANDARD DRINKS CONTAINING ALCOHOL DO YOU HAVE ON A TYPICAL DAY: 1 OR 2

## 2024-08-19 NOTE — DISCHARGE INSTR - COC
Continuity of Care Form    Patient Name: Efren Young   :  1990  MRN:  [id number]    Admit date:  2024  Discharge date:  ***    Code Status Order: No Order   Advance Directives:   Advance Care Flowsheet Documentation             Admitting Physician:  No admitting provider for patient encounter.  PCP: Ag Beaver MD    Discharging Nurse: ***  Discharging Hospital Unit/Room#:   Discharging Unit Phone Number: ***    Emergency Contact:   Extended Emergency Contact Information  Primary Emergency Contact: Yu Young  Address: [address] [place] [address]  Home Phone: [phone]  Mobile Phone: [phone]  Relation: Spouse    Past Surgical History:  Past Surgical History:   Procedure Laterality Date    KNEE ARTHROSCOPY Right     ACL        Immunization History:     There is no immunization history on file for this patient.    Active Problems:  Patient Active Problem List   Diagnosis Code    Lumbar disc disease M51.9    DDD (degenerative disc disease), cervical M50.30    Post-traumatic osteoarthritis of right knee M17.31    Sciatic leg pain M54.30    Right hip joint effusion M25.451    Opioid dependence on agonist therapy (HCC) F11.20    Opioid withdrawal (HCC) F11.93    Chest pain R07.9    Shortness of breath R06.02    Exposure to chemical inhalation Z77.098       Isolation/Infection:   Isolation            No Isolation          Patient Infection Status       Infection Onset Added Last Indicated Last Indicated By Review Planned Expiration Resolved Resolved By    None active    Resolved    COVID-19 20 Covid-19 Ambulatory   20 Infection                        Nurse Assessment:  Last Vital Signs: /65   Pulse 70   Temp 98.1 °F (36.7 °C) (Oral)   Resp 16   SpO2 94%     Last documented pain score (0-10 scale):    Last Weight:   Wt Readings from Last 1 Encounters:   23 129.3 kg (285 lb)     Mental

## 2024-08-19 NOTE — DISCHARGE INSTRUCTIONS
Return if increased chest pain shortness of breath follow-up with cardiologist and family doctor soon as possible avoiding exposure to chlorine or any other toxic and ablation

## 2024-08-19 NOTE — ED PROVIDER NOTES
Points), risk of MACE of 12-16.6% (discuss admission for further testing)  High Score:        (7-10 Points), risk of MACE of 50-65% (Admit ALL as they are candidates for early invasive measures)    -------------------------------------------------- RESULTS -------------------------------------------------  I have personally reviewed all laboratory and imaging results for this patient. Results are listed below.     LABS:  Results for orders placed or performed during the hospital encounter of 08/19/24   EKG 12 Lead   Result Value Ref Range    Ventricular Rate 67 BPM    Atrial Rate 67 BPM    P-R Interval 200 ms    QRS Duration 114 ms    Q-T Interval 412 ms    QTc Calculation (Bazett) 435 ms    P Axis 59 degrees    R Axis 15 degrees    T Axis 31 degrees       RADIOLOGY:  Interpreted by Radiologist.  XR CHEST 1 VIEW    (Results Pending)         EKG Interpretation  Interpreted by emergency department physician    Rhythm: normal sinus   Rate: normal  Axis: normal  Conduction: normal  ST Segments: no acute change  T Waves: no acute change    Clinical Impression: no acute changes  Comparison to prior EKG: stable as compared to patient's most recent EKG      ------------------------- NURSING NOTES AND VITALS REVIEWED ---------------------------   The nursing notes within the ED encounter and vital signs as below have been reviewed by myself.  BP (!) 146/90   Pulse 64   Temp 98.1 °F (36.7 °C) (Oral)   Resp 18   SpO2 98%   Oxygen Saturation Interpretation: Normal    The patient’s available past medical records and past encounters were reviewed.        ------------------------------ ED COURSE/MEDICAL DECISION MAKING----------------------  Medications - No data to display          Medical Decision Making:     Efren Young is a 34 y.o. male presenting to the ED for chest pain shortness of breath after being exposed to chlorine tablets in his basement, beginning hours ago.  The complaint has been persistent, mild in

## 2024-10-02 LAB
25(OH)D3 SERPL-MCNC: 17 NG/ML (ref 30–100)
ALBUMIN SERPL-MCNC: 4.5 G/DL (ref 3.5–5.2)
ALP SERPL-CCNC: 79 U/L (ref 40–129)
ALT SERPL-CCNC: 32 U/L (ref 0–40)
ANION GAP SERPL CALCULATED.3IONS-SCNC: 14 MMOL/L (ref 7–16)
AST SERPL-CCNC: 23 U/L (ref 0–39)
BASOPHILS # BLD: 0.05 K/UL (ref 0–0.2)
BASOPHILS NFR BLD: 1 % (ref 0–2)
BILIRUB SERPL-MCNC: 0.3 MG/DL (ref 0–1.2)
BUN SERPL-MCNC: 12 MG/DL (ref 6–20)
CALCIUM SERPL-MCNC: 9.7 MG/DL (ref 8.6–10.2)
CHLORIDE SERPL-SCNC: 104 MMOL/L (ref 98–107)
CHOLEST SERPL-MCNC: 244 MG/DL
CO2 SERPL-SCNC: 24 MMOL/L (ref 22–29)
CREAT SERPL-MCNC: 0.7 MG/DL (ref 0.7–1.2)
EOSINOPHIL # BLD: 0.17 K/UL (ref 0.05–0.5)
EOSINOPHILS RELATIVE PERCENT: 3 % (ref 0–6)
ERYTHROCYTE [DISTWIDTH] IN BLOOD BY AUTOMATED COUNT: 12.3 % (ref 11.5–15)
GFR, ESTIMATED: >90 ML/MIN/1.73M2
GLUCOSE P FAST SERPL-MCNC: 91 MG/DL (ref 74–99)
HBA1C MFR BLD: 5.3 % (ref 4–5.6)
HCT VFR BLD AUTO: 45.7 % (ref 37–54)
HDLC SERPL-MCNC: 37 MG/DL
HGB BLD-MCNC: 15.3 G/DL (ref 12.5–16.5)
IMM GRANULOCYTES # BLD AUTO: 0.04 K/UL (ref 0–0.58)
IMM GRANULOCYTES NFR BLD: 1 % (ref 0–5)
LDLC SERPL CALC-MCNC: 163 MG/DL
LYMPHOCYTES NFR BLD: 2.04 K/UL (ref 1.5–4)
LYMPHOCYTES RELATIVE PERCENT: 37 % (ref 20–42)
MCH RBC QN AUTO: 29.1 PG (ref 26–35)
MCHC RBC AUTO-ENTMCNC: 33.5 G/DL (ref 32–34.5)
MCV RBC AUTO: 86.9 FL (ref 80–99.9)
MONOCYTES NFR BLD: 0.58 K/UL (ref 0.1–0.95)
MONOCYTES NFR BLD: 11 % (ref 2–12)
NEUTROPHILS NFR BLD: 48 % (ref 43–80)
NEUTS SEG NFR BLD: 2.63 K/UL (ref 1.8–7.3)
PLATELET # BLD AUTO: 255 K/UL (ref 130–450)
PMV BLD AUTO: 10 FL (ref 7–12)
POTASSIUM SERPL-SCNC: 4.2 MMOL/L (ref 3.5–5)
PROT SERPL-MCNC: 7.4 G/DL (ref 6.4–8.3)
RBC # BLD AUTO: 5.26 M/UL (ref 3.8–5.8)
SODIUM SERPL-SCNC: 142 MMOL/L (ref 132–146)
TRIGL SERPL-MCNC: 222 MG/DL
TSH SERPL DL<=0.05 MIU/L-ACNC: 0.72 UIU/ML (ref 0.27–4.2)
VLDLC SERPL CALC-MCNC: 44 MG/DL
WBC OTHER # BLD: 5.5 K/UL (ref 4.5–11.5)

## 2024-11-25 ENCOUNTER — HOSPITAL ENCOUNTER (EMERGENCY)
Age: 34
Discharge: HOME | End: 2024-11-25
Attending: EMERGENCY MEDICINE
Payer: COMMERCIAL

## 2024-11-25 ENCOUNTER — APPOINTMENT (OUTPATIENT)
Dept: GENERAL RADIOLOGY | Age: 34
End: 2024-11-25
Payer: COMMERCIAL

## 2024-11-25 VITALS
OXYGEN SATURATION: 98 % | HEART RATE: 60 BPM | TEMPERATURE: 97.7 F | RESPIRATION RATE: 16 BRPM | DIASTOLIC BLOOD PRESSURE: 92 MMHG | SYSTOLIC BLOOD PRESSURE: 151 MMHG

## 2024-11-25 DIAGNOSIS — R10.9 LEFT FLANK PAIN: Primary | ICD-10-CM

## 2024-11-25 DIAGNOSIS — R07.89 LEFT-SIDED CHEST WALL PAIN: ICD-10-CM

## 2024-11-25 LAB
ALBUMIN SERPL-MCNC: 4.6 G/DL (ref 3.5–5.2)
ALP SERPL-CCNC: 75 U/L (ref 40–129)
ALT SERPL-CCNC: 36 U/L (ref 0–40)
AMYLASE SERPL-CCNC: 62 U/L (ref 20–100)
ANION GAP SERPL CALCULATED.3IONS-SCNC: 9 MMOL/L (ref 7–16)
AST SERPL-CCNC: 28 U/L (ref 0–39)
BILIRUB DIRECT SERPL-MCNC: <0.2 MG/DL (ref 0–0.3)
BILIRUB INDIRECT SERPL-MCNC: NORMAL MG/DL (ref 0–1)
BILIRUB SERPL-MCNC: 0.4 MG/DL (ref 0–1.2)
BILIRUB UR QL STRIP: NEGATIVE
BNP SERPL-MCNC: 58 PG/ML (ref 0–125)
BUN SERPL-MCNC: 13 MG/DL (ref 6–20)
CALCIUM SERPL-MCNC: 9.4 MG/DL (ref 8.6–10.2)
CHLORIDE SERPL-SCNC: 104 MMOL/L (ref 98–107)
CK SERPL-CCNC: 568 U/L (ref 20–200)
CLARITY UR: CLEAR
CO2 SERPL-SCNC: 27 MMOL/L (ref 22–29)
COLOR UR: YELLOW
CREAT SERPL-MCNC: 0.9 MG/DL (ref 0.7–1.2)
D-DIMER QUANTITATIVE: <200 NG/ML DDU (ref 0–230)
EKG ATRIAL RATE: 62 BPM
EKG P AXIS: 48 DEGREES
EKG P-R INTERVAL: 202 MS
EKG Q-T INTERVAL: 396 MS
EKG QRS DURATION: 106 MS
EKG QTC CALCULATION (BAZETT): 401 MS
EKG R AXIS: 22 DEGREES
EKG T AXIS: 38 DEGREES
EKG VENTRICULAR RATE: 62 BPM
ERYTHROCYTE [DISTWIDTH] IN BLOOD BY AUTOMATED COUNT: 12.1 % (ref 11.5–15)
GFR, ESTIMATED: >90 ML/MIN/1.73M2
GLUCOSE SERPL-MCNC: 98 MG/DL (ref 74–99)
GLUCOSE UR STRIP-MCNC: NEGATIVE MG/DL
HCT VFR BLD AUTO: 45.6 % (ref 37–54)
HGB BLD-MCNC: 15.4 G/DL (ref 12.5–16.5)
HGB UR QL STRIP.AUTO: NEGATIVE
INR PPP: 1.1
KETONES UR STRIP-MCNC: NEGATIVE MG/DL
LACTATE BLDV-SCNC: 0.8 MMOL/L (ref 0.5–2.2)
LEUKOCYTE ESTERASE UR QL STRIP: NEGATIVE
LIPASE SERPL-CCNC: 22 U/L (ref 13–60)
MAGNESIUM SERPL-MCNC: 1.9 MG/DL (ref 1.6–2.6)
MCH RBC QN AUTO: 28.2 PG (ref 26–35)
MCHC RBC AUTO-ENTMCNC: 33.8 G/DL (ref 32–34.5)
MCV RBC AUTO: 83.5 FL (ref 80–99.9)
NITRITE UR QL STRIP: NEGATIVE
PH UR STRIP: 6 [PH] (ref 5–9)
PLATELET # BLD AUTO: 231 K/UL (ref 130–450)
PMV BLD AUTO: 9 FL (ref 7–12)
POTASSIUM SERPL-SCNC: 3.6 MMOL/L (ref 3.5–5)
PROT SERPL-MCNC: 7.4 G/DL (ref 6.4–8.3)
PROT UR STRIP-MCNC: NEGATIVE MG/DL
PROTHROMBIN TIME: 12.3 SEC (ref 9.3–12.4)
RBC # BLD AUTO: 5.46 M/UL (ref 3.8–5.8)
RBC #/AREA URNS HPF: ABNORMAL /HPF
SODIUM SERPL-SCNC: 140 MMOL/L (ref 132–146)
SP GR UR STRIP: <1.005 (ref 1–1.03)
TROPONIN I SERPL HS-MCNC: 10 NG/L (ref 0–11)
UROBILINOGEN UR STRIP-ACNC: 0.2 EU/DL (ref 0–1)
WBC #/AREA URNS HPF: ABNORMAL /HPF
WBC OTHER # BLD: 6.8 K/UL (ref 4.5–11.5)

## 2024-11-25 PROCEDURE — 85027 COMPLETE CBC AUTOMATED: CPT

## 2024-11-25 PROCEDURE — 80053 COMPREHEN METABOLIC PANEL: CPT

## 2024-11-25 PROCEDURE — 83735 ASSAY OF MAGNESIUM: CPT

## 2024-11-25 PROCEDURE — 93010 ELECTROCARDIOGRAM REPORT: CPT | Performed by: INTERNAL MEDICINE

## 2024-11-25 PROCEDURE — 85379 FIBRIN DEGRADATION QUANT: CPT

## 2024-11-25 PROCEDURE — 85610 PROTHROMBIN TIME: CPT

## 2024-11-25 PROCEDURE — 71045 X-RAY EXAM CHEST 1 VIEW: CPT

## 2024-11-25 PROCEDURE — 96374 THER/PROPH/DIAG INJ IV PUSH: CPT

## 2024-11-25 PROCEDURE — 2580000003 HC RX 258: Performed by: EMERGENCY MEDICINE

## 2024-11-25 PROCEDURE — 74176 CT ABD & PELVIS W/O CONTRAST: CPT

## 2024-11-25 PROCEDURE — 82150 ASSAY OF AMYLASE: CPT

## 2024-11-25 PROCEDURE — 83880 ASSAY OF NATRIURETIC PEPTIDE: CPT

## 2024-11-25 PROCEDURE — 6360000002 HC RX W HCPCS: Performed by: EMERGENCY MEDICINE

## 2024-11-25 PROCEDURE — 83690 ASSAY OF LIPASE: CPT

## 2024-11-25 PROCEDURE — 82248 BILIRUBIN DIRECT: CPT

## 2024-11-25 PROCEDURE — 83605 ASSAY OF LACTIC ACID: CPT

## 2024-11-25 PROCEDURE — 93005 ELECTROCARDIOGRAM TRACING: CPT | Performed by: EMERGENCY MEDICINE

## 2024-11-25 PROCEDURE — 84484 ASSAY OF TROPONIN QUANT: CPT

## 2024-11-25 PROCEDURE — 99285 EMERGENCY DEPT VISIT HI MDM: CPT

## 2024-11-25 PROCEDURE — 81001 URINALYSIS AUTO W/SCOPE: CPT

## 2024-11-25 PROCEDURE — 82550 ASSAY OF CK (CPK): CPT

## 2024-11-25 RX ORDER — KETOROLAC TROMETHAMINE 30 MG/ML
30 INJECTION, SOLUTION INTRAMUSCULAR; INTRAVENOUS ONCE
Status: DISCONTINUED | OUTPATIENT
Start: 2024-11-25 | End: 2024-11-25

## 2024-11-25 RX ORDER — 0.9 % SODIUM CHLORIDE 0.9 %
1000 INTRAVENOUS SOLUTION INTRAVENOUS ONCE
Status: COMPLETED | OUTPATIENT
Start: 2024-11-25 | End: 2024-11-25

## 2024-11-25 RX ORDER — KETOROLAC TROMETHAMINE 30 MG/ML
15 INJECTION, SOLUTION INTRAMUSCULAR; INTRAVENOUS ONCE
Status: COMPLETED | OUTPATIENT
Start: 2024-11-25 | End: 2024-11-25

## 2024-11-25 RX ORDER — ALPRAZOLAM 0.25 MG/1
0.25 TABLET ORAL NIGHTLY PRN
COMMUNITY

## 2024-11-25 RX ADMIN — KETOROLAC TROMETHAMINE 15 MG: 30 INJECTION, SOLUTION INTRAMUSCULAR at 05:36

## 2024-11-25 RX ADMIN — SODIUM CHLORIDE 1000 ML: 9 INJECTION, SOLUTION INTRAVENOUS at 05:39

## 2024-11-25 ASSESSMENT — LIFESTYLE VARIABLES
HOW OFTEN DO YOU HAVE A DRINK CONTAINING ALCOHOL: NEVER
HOW MANY STANDARD DRINKS CONTAINING ALCOHOL DO YOU HAVE ON A TYPICAL DAY: PATIENT DOES NOT DRINK

## 2024-11-25 ASSESSMENT — PAIN SCALES - GENERAL: PAINLEVEL_OUTOF10: 5

## 2024-11-25 ASSESSMENT — PAIN - FUNCTIONAL ASSESSMENT: PAIN_FUNCTIONAL_ASSESSMENT: 0-10

## 2024-11-25 ASSESSMENT — PAIN DESCRIPTION - ORIENTATION: ORIENTATION: LEFT

## 2024-11-25 ASSESSMENT — PAIN DESCRIPTION - DESCRIPTORS: DESCRIPTORS: ACHING;DISCOMFORT

## 2024-11-25 ASSESSMENT — PAIN DESCRIPTION - LOCATION: LOCATION: ABDOMEN

## 2024-11-25 NOTE — ED PROVIDER NOTES
HPI:  11/25/24, Time: 4:59 AM SANTI Young is a 34 y.o. male presenting to the ED for left flank pain, beginning 1-1/2-month ago.  The complaint has been persistent, mild in severity, and worsened by nothing.  Patient had no trauma he is not short of breath at this time is not tachycardic he is not hypoxic he seems to have more of left flank pain his abdomen is benign he did say he has had some increased frequency of urination we are checking a urine on him as well he has no leg swelling no calf pain his pulses are equal in bilateral questionable whether he has left flank pain    ROS:   Pertinent positives and negatives are stated within HPI, all other systems reviewed and are negative.  --------------------------------------------- PAST HISTORY ---------------------------------------------  Past Medical History:  has a past medical history of Asthma, Back problem, and COVID-19.    Past Surgical History:  has a past surgical history that includes Knee arthroscopy (Right).    Social History:  reports that he has never smoked. He has quit using smokeless tobacco.  His smokeless tobacco use included chew. He reports that he does not currently use alcohol. He reports that he does not use drugs.    Family History: family history includes Diabetes in his father.     The patient’s home medications have been reviewed.    Allergies: Patient has no known allergies.    ---------------------------------------------------PHYSICAL EXAM--------------------------------------    Constitutional/General: Alert and oriented x3, well appearing, non toxic in NAD  Head: Normocephalic and atraumatic  Eyes: PERRL, EOMI  Mouth: Oropharynx clear, handling secretions, no trismus  Neck: Supple, full ROM, non tender to palpation in the midline, no stridor, no crepitus, no meningeal signs  Pulmonary: Lungs clear to auscultation bilaterally, no wheezes, rales, or rhonchi. Not in respiratory distress  Cardiovascular:  Regular rate.

## 2025-02-01 ENCOUNTER — APPOINTMENT (OUTPATIENT)
Dept: CT IMAGING | Age: 35
End: 2025-02-01
Payer: COMMERCIAL

## 2025-02-01 ENCOUNTER — HOSPITAL ENCOUNTER (EMERGENCY)
Age: 35
Discharge: HOME | End: 2025-02-01
Payer: COMMERCIAL

## 2025-02-01 VITALS
HEART RATE: 85 BPM | SYSTOLIC BLOOD PRESSURE: 129 MMHG | OXYGEN SATURATION: 96 % | HEIGHT: 74 IN | DIASTOLIC BLOOD PRESSURE: 87 MMHG | TEMPERATURE: 98.2 F | RESPIRATION RATE: 18 BRPM | BODY MASS INDEX: 36.59 KG/M2

## 2025-02-01 DIAGNOSIS — M51.369 L4-L5 DISC BULGE: Primary | ICD-10-CM

## 2025-02-01 PROCEDURE — 96372 THER/PROPH/DIAG INJ SC/IM: CPT

## 2025-02-01 PROCEDURE — 6360000002 HC RX W HCPCS: Performed by: PHYSICIAN ASSISTANT

## 2025-02-01 PROCEDURE — 72131 CT LUMBAR SPINE W/O DYE: CPT

## 2025-02-01 PROCEDURE — 6360000002 HC RX W HCPCS

## 2025-02-01 PROCEDURE — 99284 EMERGENCY DEPT VISIT MOD MDM: CPT

## 2025-02-01 RX ORDER — KETOROLAC TROMETHAMINE 30 MG/ML
30 INJECTION, SOLUTION INTRAMUSCULAR; INTRAVENOUS ONCE
Status: COMPLETED | OUTPATIENT
Start: 2025-02-01 | End: 2025-02-01

## 2025-02-01 RX ORDER — DEXAMETHASONE SODIUM PHOSPHATE 10 MG/ML
10 INJECTION INTRAMUSCULAR; INTRAVENOUS ONCE
Status: DISCONTINUED | OUTPATIENT
Start: 2025-02-01 | End: 2025-02-01 | Stop reason: HOSPADM

## 2025-02-01 RX ORDER — PREDNISONE 20 MG/1
20 TABLET ORAL 2 TIMES DAILY
Qty: 10 TABLET | Refills: 0 | Status: SHIPPED | OUTPATIENT
Start: 2025-02-01 | End: 2025-02-06

## 2025-02-01 RX ORDER — DEXAMETHASONE SODIUM PHOSPHATE 4 MG/ML
INJECTION, SOLUTION INTRA-ARTICULAR; INTRALESIONAL; INTRAMUSCULAR; INTRAVENOUS; SOFT TISSUE
Status: COMPLETED
Start: 2025-02-01 | End: 2025-02-01

## 2025-02-01 RX ORDER — CYCLOBENZAPRINE HCL 5 MG
5 TABLET ORAL 2 TIMES DAILY PRN
Qty: 10 TABLET | Refills: 0 | Status: SHIPPED | OUTPATIENT
Start: 2025-02-01 | End: 2025-02-11

## 2025-02-01 RX ADMIN — KETOROLAC TROMETHAMINE 30 MG: 30 INJECTION, SOLUTION INTRAMUSCULAR at 11:48

## 2025-02-01 RX ADMIN — DEXAMETHASONE SODIUM PHOSPHATE 10 MG: 4 INJECTION INTRA-ARTICULAR; INTRALESIONAL; INTRAMUSCULAR; INTRAVENOUS; SOFT TISSUE at 11:48

## 2025-02-01 ASSESSMENT — PAIN - FUNCTIONAL ASSESSMENT: PAIN_FUNCTIONAL_ASSESSMENT: 0-10

## 2025-02-01 ASSESSMENT — PAIN DESCRIPTION - LOCATION
LOCATION: BACK
LOCATION: BACK

## 2025-02-01 ASSESSMENT — PAIN SCALES - GENERAL
PAINLEVEL_OUTOF10: 10
PAINLEVEL_OUTOF10: 10

## 2025-02-01 ASSESSMENT — PAIN DESCRIPTION - ONSET: ONSET: ON-GOING

## 2025-02-01 ASSESSMENT — PAIN DESCRIPTION - DESCRIPTORS: DESCRIPTORS: ACHING;DISCOMFORT;DULL

## 2025-02-01 ASSESSMENT — PAIN DESCRIPTION - FREQUENCY: FREQUENCY: CONTINUOUS

## 2025-02-01 ASSESSMENT — PAIN DESCRIPTION - PAIN TYPE: TYPE: ACUTE PAIN

## 2025-02-01 ASSESSMENT — PAIN DESCRIPTION - ORIENTATION: ORIENTATION: LOWER

## 2025-02-01 NOTE — ED PROVIDER NOTES
Independent [facility] Visit.    HPI:  2/1/25,   Time: 11:35 AM SANTI Young is a 34 y.o. male presenting to the ED for low back pain.  A week ago patient was lifting a heavy box that weighed 120 pounds.  When he was trying to lift it got stuck on a table and jerked his back.  He reports sharp pain that is been persisting over the past week.  Along the midline lumbar spine.  Some radiation to the bilateral paraspinal muscles as well.  Radiates into both butt cheeks and down both legs.  Patient does report a history of chronic back pain.  Has been taking ibuprofen and Norco's at home with no relief.  Walking and moving around seems to alleviate the pain but sitting makes it worse.  No numbness and tingling.  Denies fever, chills, body aches, chest pain, shortness of breath, abdominal pain, nausea vomiting diarrhea, dysuria, saddle anesthesia or bladder or bowel incontinence.    ROS:   Pertinent positives and negatives are stated within HPI, all other systems reviewed and are negative.  --------------------------------------------- PAST HISTORY ---------------------------------------------  Past Medical History:  has a past medical history of Asthma, Back problem, and COVID-19.    Past Surgical History:  has a past surgical history that includes Knee arthroscopy (Right).    Social History:  reports that he has never smoked. He has quit using smokeless tobacco.  His smokeless tobacco use included chew. He reports that he does not currently use alcohol. He reports that he does not use drugs.    Family History: family history includes Diabetes in his father.     The patient’s home medications have been reviewed.    Allergies: Patient has no known allergies.    -------------------------------------------------- RESULTS -------------------------------------------------  All laboratory and radiology results have been personally reviewed by myself   LABS:  No results found for this visit on

## 2025-05-27 ENCOUNTER — APPOINTMENT (OUTPATIENT)
Dept: CT IMAGING | Age: 35
End: 2025-05-27
Payer: COMMERCIAL

## 2025-05-27 ENCOUNTER — APPOINTMENT (OUTPATIENT)
Dept: GENERAL RADIOLOGY | Age: 35
End: 2025-05-27
Payer: COMMERCIAL

## 2025-05-27 ENCOUNTER — HOSPITAL ENCOUNTER (EMERGENCY)
Age: 35
Discharge: HOME | End: 2025-05-27
Attending: STUDENT IN AN ORGANIZED HEALTH CARE EDUCATION/TRAINING PROGRAM
Payer: COMMERCIAL

## 2025-05-27 VITALS
SYSTOLIC BLOOD PRESSURE: 109 MMHG | HEART RATE: 66 BPM | TEMPERATURE: 98.3 F | RESPIRATION RATE: 18 BRPM | OXYGEN SATURATION: 96 % | DIASTOLIC BLOOD PRESSURE: 63 MMHG | WEIGHT: 260 LBS | BODY MASS INDEX: 33.38 KG/M2

## 2025-05-27 DIAGNOSIS — H66.92 LEFT ACUTE OTITIS MEDIA: ICD-10-CM

## 2025-05-27 DIAGNOSIS — R07.9 CHEST PAIN, UNSPECIFIED TYPE: Primary | ICD-10-CM

## 2025-05-27 LAB
ALBUMIN SERPL-MCNC: 4.2 G/DL (ref 3.5–5.2)
ALP SERPL-CCNC: 90 U/L (ref 40–129)
ALT SERPL-CCNC: 34 U/L (ref 0–40)
ANION GAP SERPL CALCULATED.3IONS-SCNC: 11 MMOL/L (ref 7–16)
AST SERPL-CCNC: 24 U/L (ref 0–39)
BASOPHILS # BLD: 0.06 K/UL (ref 0–0.2)
BASOPHILS NFR BLD: 1 % (ref 0–2)
BILIRUB SERPL-MCNC: 0.2 MG/DL (ref 0–1.2)
BUN SERPL-MCNC: 13 MG/DL (ref 6–20)
CALCIUM SERPL-MCNC: 9.4 MG/DL (ref 8.6–10.2)
CHLORIDE SERPL-SCNC: 102 MMOL/L (ref 98–107)
CO2 SERPL-SCNC: 27 MMOL/L (ref 22–29)
CREAT SERPL-MCNC: 0.8 MG/DL (ref 0.7–1.2)
EKG ATRIAL RATE: 56 BPM
EKG P AXIS: 44 DEGREES
EKG P-R INTERVAL: 192 MS
EKG Q-T INTERVAL: 410 MS
EKG QRS DURATION: 104 MS
EKG QTC CALCULATION (BAZETT): 395 MS
EKG R AXIS: 30 DEGREES
EKG T AXIS: 41 DEGREES
EKG VENTRICULAR RATE: 56 BPM
EOSINOPHIL # BLD: 0.18 K/UL (ref 0.05–0.5)
EOSINOPHILS RELATIVE PERCENT: 3 % (ref 0–6)
ERYTHROCYTE [DISTWIDTH] IN BLOOD BY AUTOMATED COUNT: 12.4 % (ref 11.5–15)
GFR, ESTIMATED: >90 ML/MIN/1.73M2
GLUCOSE SERPL-MCNC: 110 MG/DL (ref 74–99)
HCT VFR BLD AUTO: 42.5 % (ref 37–54)
HGB BLD-MCNC: 14.6 G/DL (ref 12.5–16.5)
IMM GRANULOCYTES # BLD AUTO: 0.04 K/UL (ref 0–0.58)
IMM GRANULOCYTES NFR BLD: 1 % (ref 0–5)
LIPASE SERPL-CCNC: 19 U/L (ref 13–60)
LYMPHOCYTES NFR BLD: 2.83 K/UL (ref 1.5–4)
LYMPHOCYTES RELATIVE PERCENT: 43 % (ref 20–42)
MCH RBC QN AUTO: 29.4 PG (ref 26–35)
MCHC RBC AUTO-ENTMCNC: 34.4 G/DL (ref 32–34.5)
MCV RBC AUTO: 85.5 FL (ref 80–99.9)
MONOCYTES NFR BLD: 0.62 K/UL (ref 0.1–0.95)
MONOCYTES NFR BLD: 10 % (ref 2–12)
NEUTROPHILS NFR BLD: 43 % (ref 43–80)
NEUTS SEG NFR BLD: 2.8 K/UL (ref 1.8–7.3)
PLATELET # BLD AUTO: 219 K/UL (ref 130–450)
PMV BLD AUTO: 9.3 FL (ref 7–12)
POTASSIUM SERPL-SCNC: 3.5 MMOL/L (ref 3.5–5)
PROT SERPL-MCNC: 7.1 G/DL (ref 6.4–8.3)
RBC # BLD AUTO: 4.97 M/UL (ref 3.8–5.8)
SODIUM SERPL-SCNC: 140 MMOL/L (ref 132–146)
TROPONIN I SERPL HS-MCNC: 7 NG/L (ref 0–22)
TROPONIN I SERPL HS-MCNC: 7 NG/L (ref 0–22)
TROPONIN I SERPL HS-MCNC: <6 NG/L (ref 0–22)
WBC OTHER # BLD: 6.5 K/UL (ref 4.5–11.5)

## 2025-05-27 PROCEDURE — 84484 ASSAY OF TROPONIN QUANT: CPT

## 2025-05-27 PROCEDURE — 93005 ELECTROCARDIOGRAM TRACING: CPT | Performed by: STUDENT IN AN ORGANIZED HEALTH CARE EDUCATION/TRAINING PROGRAM

## 2025-05-27 PROCEDURE — 2580000003 HC RX 258: Performed by: STUDENT IN AN ORGANIZED HEALTH CARE EDUCATION/TRAINING PROGRAM

## 2025-05-27 PROCEDURE — 6360000002 HC RX W HCPCS: Performed by: STUDENT IN AN ORGANIZED HEALTH CARE EDUCATION/TRAINING PROGRAM

## 2025-05-27 PROCEDURE — 85025 COMPLETE CBC W/AUTO DIFF WBC: CPT

## 2025-05-27 PROCEDURE — 6360000004 HC RX CONTRAST MEDICATION: Performed by: RADIOLOGY

## 2025-05-27 PROCEDURE — 93010 ELECTROCARDIOGRAM REPORT: CPT | Performed by: INTERNAL MEDICINE

## 2025-05-27 PROCEDURE — 74177 CT ABD & PELVIS W/CONTRAST: CPT

## 2025-05-27 PROCEDURE — 71275 CT ANGIOGRAPHY CHEST: CPT

## 2025-05-27 PROCEDURE — 83690 ASSAY OF LIPASE: CPT

## 2025-05-27 PROCEDURE — 96372 THER/PROPH/DIAG INJ SC/IM: CPT

## 2025-05-27 PROCEDURE — 99285 EMERGENCY DEPT VISIT HI MDM: CPT

## 2025-05-27 PROCEDURE — 96374 THER/PROPH/DIAG INJ IV PUSH: CPT

## 2025-05-27 PROCEDURE — 96375 TX/PRO/DX INJ NEW DRUG ADDON: CPT

## 2025-05-27 PROCEDURE — 71045 X-RAY EXAM CHEST 1 VIEW: CPT

## 2025-05-27 PROCEDURE — 80053 COMPREHEN METABOLIC PANEL: CPT

## 2025-05-27 RX ORDER — IOPAMIDOL 755 MG/ML
80 INJECTION, SOLUTION INTRAVASCULAR
Status: COMPLETED | OUTPATIENT
Start: 2025-05-27 | End: 2025-05-27

## 2025-05-27 RX ORDER — MIDAZOLAM HYDROCHLORIDE 2 MG/2ML
1 INJECTION, SOLUTION INTRAMUSCULAR; INTRAVENOUS ONCE
Status: DISCONTINUED | OUTPATIENT
Start: 2025-05-27 | End: 2025-05-27 | Stop reason: HOSPADM

## 2025-05-27 RX ORDER — METHOCARBAMOL 500 MG/1
500 TABLET, FILM COATED ORAL 4 TIMES DAILY PRN
Qty: 20 TABLET | Refills: 0 | Status: SHIPPED | OUTPATIENT
Start: 2025-05-27 | End: 2025-05-27

## 2025-05-27 RX ORDER — MORPHINE SULFATE 4 MG/ML
4 INJECTION, SOLUTION INTRAMUSCULAR; INTRAVENOUS ONCE
Refills: 0 | Status: DISCONTINUED | OUTPATIENT
Start: 2025-05-27 | End: 2025-05-27 | Stop reason: HOSPADM

## 2025-05-27 RX ORDER — KETOROLAC TROMETHAMINE 30 MG/ML
15 INJECTION, SOLUTION INTRAMUSCULAR; INTRAVENOUS ONCE
Status: COMPLETED | OUTPATIENT
Start: 2025-05-27 | End: 2025-05-27

## 2025-05-27 RX ORDER — ORPHENADRINE CITRATE 30 MG/ML
60 INJECTION INTRAMUSCULAR; INTRAVENOUS ONCE
Status: COMPLETED | OUTPATIENT
Start: 2025-05-27 | End: 2025-05-27

## 2025-05-27 RX ORDER — METHOCARBAMOL 500 MG/1
500 TABLET, FILM COATED ORAL 4 TIMES DAILY PRN
Qty: 20 TABLET | Refills: 0 | Status: SHIPPED | OUTPATIENT
Start: 2025-05-27 | End: 2025-06-01

## 2025-05-27 RX ADMIN — IOPAMIDOL 80 ML: 755 INJECTION, SOLUTION INTRAVENOUS at 06:01

## 2025-05-27 RX ADMIN — FAMOTIDINE 20 MG: 10 INJECTION, SOLUTION INTRAVENOUS at 05:18

## 2025-05-27 RX ADMIN — ORPHENADRINE CITRATE 60 MG: 60 INJECTION INTRAMUSCULAR; INTRAVENOUS at 03:45

## 2025-05-27 RX ADMIN — KETOROLAC TROMETHAMINE 15 MG: 30 INJECTION, SOLUTION INTRAMUSCULAR at 02:12

## 2025-05-27 ASSESSMENT — PAIN DESCRIPTION - ORIENTATION
ORIENTATION: LEFT
ORIENTATION: MID;UPPER

## 2025-05-27 ASSESSMENT — PAIN SCALES - GENERAL
PAINLEVEL_OUTOF10: 10
PAINLEVEL_OUTOF10: 9
PAINLEVEL_OUTOF10: 8
PAINLEVEL_OUTOF10: 9

## 2025-05-27 ASSESSMENT — PAIN DESCRIPTION - LOCATION
LOCATION: CHEST;NECK
LOCATION: CHEST
LOCATION: CHEST;BACK
LOCATION: CHEST;NECK

## 2025-05-27 ASSESSMENT — PAIN DESCRIPTION - DESCRIPTORS
DESCRIPTORS: ACHING;DISCOMFORT;SHARP;TIGHTNESS
DESCRIPTORS: DISCOMFORT;SHARP
DESCRIPTORS: DISCOMFORT;SHARP
DESCRIPTORS: SHARP;SHOOTING;DISCOMFORT

## 2025-05-27 ASSESSMENT — PAIN - FUNCTIONAL ASSESSMENT
PAIN_FUNCTIONAL_ASSESSMENT: PREVENTS OR INTERFERES WITH ALL ACTIVE AND SOME PASSIVE ACTIVITIES
PAIN_FUNCTIONAL_ASSESSMENT: 0-10
PAIN_FUNCTIONAL_ASSESSMENT: PREVENTS OR INTERFERES WITH ALL ACTIVE AND SOME PASSIVE ACTIVITIES

## 2025-05-27 ASSESSMENT — PAIN DESCRIPTION - ONSET: ONSET: ON-GOING

## 2025-05-27 ASSESSMENT — PAIN DESCRIPTION - PAIN TYPE
TYPE: ACUTE PAIN
TYPE: ACUTE PAIN

## 2025-05-27 ASSESSMENT — PAIN DESCRIPTION - FREQUENCY: FREQUENCY: CONTINUOUS

## 2025-05-27 NOTE — ED PROVIDER NOTES
[facility] EMERGENCY DEPARTMENT  EMERGENCY DEPARTMENT ENCOUNTER    Pt Name: Efren Young  MRN: [id number]  Birthdate 1990  Date of evaluation: 5/27/2025  Provider: Lito Watson MD  PCP: Eitan Nascimento DO  Note Started: 1:49 AM EDT 5/27/25    HPI     Patient is a 35 y.o. male presents with a chief complaint of   Chief Complaint   Patient presents with    Chest Pain     CP and SOB beginning around 2200; denies cardiac hx   .    Patient presents for chest pain and shortness of breath.  Patient does not have a history of cardiac problems.  Patient does not smoke.  Patient states that it feels worse when he moves a certain way.  Has been occurring for the past day.  Patient is also hearing impaired and states that his left ear has been hurting him more over the past couple days as well.  No changes in urinary or bowel habits.  No recent surgery recent travel history of blood clots in the past.  No leg swelling.  No history of cancer. Denies use of drugs or alcohol.  Patient states that when he takes a deep breath that hurts.     Nursing Notes were all reviewed and agreed with or any disagreements were addressed in the HPI.    History From: Patient    Review of Systems   Pertinent positives and negatives as per HPI.     Physical Exam  Vitals and nursing note reviewed.   Constitutional:       Appearance: He is well-developed.   HENT:      Head: Normocephalic and atraumatic.      Right Ear: Ear canal normal.      Left Ear: Ear canal normal.      Ears:      Comments: Left ear shows some mild bulging tympanic membrane  Eyes:      Conjunctiva/sclera: Conjunctivae normal.   Cardiovascular:      Rate and Rhythm: Normal rate and regular rhythm.      Heart sounds: Normal heart sounds. No murmur heard.  Pulmonary:      Effort: Pulmonary effort is normal. No respiratory distress.      Breath sounds: Normal breath sounds. No wheezing or rales.   Abdominal:      General: Bowel sounds are normal.